# Patient Record
Sex: FEMALE | Race: ASIAN | NOT HISPANIC OR LATINO | Employment: PART TIME | URBAN - METROPOLITAN AREA
[De-identification: names, ages, dates, MRNs, and addresses within clinical notes are randomized per-mention and may not be internally consistent; named-entity substitution may affect disease eponyms.]

---

## 2018-03-22 ENCOUNTER — OFFICE VISIT (OUTPATIENT)
Dept: INTERNAL MEDICINE | Facility: MEDICAL CENTER | Age: 43
End: 2018-03-22
Payer: COMMERCIAL

## 2018-03-22 VITALS
SYSTOLIC BLOOD PRESSURE: 140 MMHG | WEIGHT: 137 LBS | BODY MASS INDEX: 28.76 KG/M2 | DIASTOLIC BLOOD PRESSURE: 92 MMHG | TEMPERATURE: 98.1 F | HEIGHT: 58 IN | OXYGEN SATURATION: 95 % | HEART RATE: 89 BPM

## 2018-03-22 DIAGNOSIS — E66.3 OVERWEIGHT (BMI 25.0-29.9): ICD-10-CM

## 2018-03-22 DIAGNOSIS — I10 HYPERTENSION, UNSPECIFIED TYPE: ICD-10-CM

## 2018-03-22 DIAGNOSIS — N95.1 HOT FLASHES DUE TO MENOPAUSE: ICD-10-CM

## 2018-03-22 DIAGNOSIS — E78.5 HYPERLIPIDEMIA, UNSPECIFIED HYPERLIPIDEMIA TYPE: ICD-10-CM

## 2018-03-22 DIAGNOSIS — Z76.89 ENCOUNTER TO ESTABLISH CARE: ICD-10-CM

## 2018-03-22 DIAGNOSIS — E03.9 HYPOTHYROIDISM, UNSPECIFIED TYPE: ICD-10-CM

## 2018-03-22 DIAGNOSIS — Z00.00 HEALTH CARE MAINTENANCE: ICD-10-CM

## 2018-03-22 PROCEDURE — 99204 OFFICE O/P NEW MOD 45 MIN: CPT | Mod: GC | Performed by: INTERNAL MEDICINE

## 2018-03-22 RX ORDER — ALBUTEROL SULFATE 90 UG/1
2 AEROSOL, METERED RESPIRATORY (INHALATION) EVERY 6 HOURS PRN
COMMUNITY
End: 2018-08-16 | Stop reason: SDUPTHER

## 2018-03-22 RX ORDER — VITAMIN E 268 MG
400 CAPSULE ORAL DAILY
Qty: 30 CAP | Refills: 3 | Status: SHIPPED | OUTPATIENT
Start: 2018-03-22 | End: 2018-10-11

## 2018-03-22 RX ORDER — LEVOTHYROXINE SODIUM 0.07 MG/1
TABLET ORAL
COMMUNITY
Start: 2018-03-12 | End: 2018-06-08

## 2018-03-22 RX ORDER — PRAVASTATIN SODIUM 10 MG
TABLET ORAL
COMMUNITY
Start: 2018-03-12 | End: 2018-04-02

## 2018-03-22 RX ORDER — BENZOCAINE/MENTHOL 6 MG-10 MG
LOZENGE MUCOUS MEMBRANE 2 TIMES DAILY
COMMUNITY
End: 2018-04-16

## 2018-03-22 ASSESSMENT — PATIENT HEALTH QUESTIONNAIRE - PHQ9: CLINICAL INTERPRETATION OF PHQ2 SCORE: 0

## 2018-03-22 NOTE — LETTER
[facility]  Mani Ball M.D.  [address] [place] [address]  Fax: [phone]   Authorization for Release/Disclosure of   Protected Health Information   Name: REGINE CHAMBERLAIN : 1975 SSN: [id number]   Address: [facility] [address]  [place] [address] Phone:    [phone] (home)    I authorize the entity listed below to release/disclose the PHI below to:   [facility]/Mani Ball M.D. and Mani Ball M.D.   Provider or Entity Name:                                        [facility]; Dr. Negin Winter     Address   City, State, Zip                               [address] [place] [address]   Phone: [phone]      Fax: [phone]     Reason for request: continuity of care   Information to be released:    [  ] LAST COLONOSCOPY,  including any PATH REPORT and follow-up  [  ] LAST FIT/COLOGUARD RESULT [  ] LAST DEXA  [  ] LAST MAMMOGRAM  [  ] LAST PAP  [  ] LAST LABS [  ] RETINA EXAM REPORT  [  ] IMMUNIZATION RECORDS  [X] Release all info      [  ] Check here and initial the line next to each item to release ALL health information INCLUDING  _____ Care and treatment for drug and / or alcohol abuse  _____ HIV testing, infection status, or AIDS  _____ Genetic Testing    DATES OF SERVICE OR TIME PERIOD TO BE DISCLOSED: _____________  I understand and acknowledge that:  * This Authorization may be revoked at any time by you in writing, except if your health information has already been used or disclosed.  * Your health information that will be used or disclosed as a result of you signing this authorization could be re-disclosed by the recipient. If this occurs, your re-disclosed health information may no longer be protected by State or Federal laws.  * You may refuse to sign this Authorization. Your refusal will not affect your ability to obtain treatment.  * This Authorization becomes effective upon signing and will  on (date) __________.      If no  date is indicated, this Authorization will  one (1) year from the signature date.    Name: Ameena Hines    Signature:   Date:     3/22/2018       PLEASE FAX REQUESTED RECORDS BACK TO: [phone]

## 2018-03-22 NOTE — PROGRESS NOTES
New Patient to Establish    Reason to establish: recent insurance change    CC: establish care    HPI: 43 yr old female patient with PMHx of HTN, hypothyroidism, HLD came in to establish care and f/u on chronic problems.    Patient's prior PCP- Dr. Kam, [facility]. Given her recent new insurance change , her prior PCP is not accepting her new insurance so patient came to establish care.    HTN: Diagnosed about 4 yrs back and her prior PCP started on cardizem 30 mg PO once daily. Currently denies any related symptoms.     HLD: Diagnosed about 1 yr back and on pravastatin 10 mg PO once daily. Denies any related symptoms.    Hypothyroidism: Diagnosed about 6yrs back. Currently on levothyroxine 75 mcg PO once daily. Denies related symptoms.    Hot flashes: Patient stated her menstrual cycles stopped about 4-5 months back. Since then she has been having hot flashes. But these symptoms does not limit her daily activities.    Otherwise denies any other complaints today.    Patient Active Problem List    Diagnosis Date Noted   • Hypertension 03/22/2018   • Hypothyroidism 03/22/2018   • Hyperlipidemia 03/22/2018   • Hot flashes due to menopause 03/22/2018       Past Medical History:   Diagnosis Date   • Hyperlipidemia    • Hypertension    • Thyroid disease        Current Outpatient Prescriptions   Medication Sig Dispense Refill   • levothyroxine (SYNTHROID) 75 MCG Tab      • pravastatin (PRAVACHOL) 10 MG Tab      • DILTIAZem (CARDIZEM) 30 MG Tab      • hydrocortisone 1 % Cream Apply  to affected area(s) 2 times a day.     • albuterol (PROVENTIL HFA) 108 (90 Base) MCG/ACT Aero Soln inhalation aerosol Inhale 2 Puffs by mouth every 6 hours as needed for Shortness of Breath.     • vitamin e (VITAMIN E) 400 UNIT Cap Take 1 Cap by mouth every day. 30 Cap 3     No current facility-administered medications for this visit.        Allergies as of 03/22/2018   • (No Known Allergies)       Social History  "    Social History   • Marital status:      Spouse name: N/A   • Number of children: N/A   • Years of education: N/A     Occupational History   • Not on file.     Social History Main Topics   • Smoking status: Never Smoker   • Smokeless tobacco: Never Used   • Alcohol use No   • Drug use: No   • Sexual activity: Yes     Partners: Male     Other Topics Concern   • Not on file     Social History Narrative   • No narrative on file       Family History   Problem Relation Age of Onset   • Hypertension Mother    • Hyperlipidemia Mother        Past Surgical History:   Procedure Laterality Date   • LAPAROSCOPY         ROS: As per HPI. Additional pertinent symptoms as noted below.    Constitutional: Denies fever/chills/weight changes. Positive for hot flashes  Eyes: Denies changes/pain in vision  ENT: Denies sore throat/congestion/ear ache.   Cardiovascular: Denies chest pain /palpitations/edema.   Respiratory: Denies SOB/cough/PND/orthopnea  Abdomen: Denies difficulty swallowing/ diarrhea/constipation/abdominal pain/nausea/vomiting  Genitourinary: Normal urinary habits.   Musculo-skeletal: normal ambulation.Denies muscle or joint pains.  Skin: Denies rash/lesions.  Neurological: Denies weakness/tingling/numbness/headache  Psychological: good mood and cooperative. Denies anxiety /depression       /92   Pulse 89   Temp 36.7 °C (98.1 °F)   Ht 1.473 m (4' 10\")   Wt 62.1 kg (137 lb)   SpO2 95%   BMI 28.63 kg/m²     Physical Exam  General:  Alert and oriented, No apparent distress.    Eyes: Pupils equal and reactive. No scleral icterus.    Throat: Clear no erythema or exudates noted.    Neck: Supple. No lymphadenopathy noted. Thyroid not enlarged.    Lungs: Clear to auscultation and percussion bilaterally.    Cardiovascular: Regular rate and rhythm. No murmurs, rubs or gallops.    Abdomen:  Benign. No rebound or guarding noted.    Extremities: No clubbing, cyanosis, edema.    Skin: Clear. No rash or suspicious " skin lesions noted.  Neurological: Oriented to time, place, and person .Cranial nerves intact. No motor/sensory deficits.Reflexes were normal and symmetrical in both upper and lower extremities     Musculoskeletal : NROM of all extremities. No tenderness or deformity noted.     Note: I have reviewed all pertinent labs and diagnostic tests associated with this visit with specific comments listed under the assessment and plan below      Assessment and Plan    1. Hypertension, unspecified type  - diagnosed about 4 yrs back  - currently on Cardizem 30 mg PO once daily  - denies any related symptoms  - today BP in the office 140/92  -  states her recent blood work was about 3months back  - requested records from her prior care  - will continue to follow up in 2 weeks.    2. Hypothyroidism, unspecified type  - currently on levothyroxine 75 mcg PO once daily   - diagnosed 6 yrs years back  - denies any related symptoms  -  states her recent blood work was about 3months back  - requested records from her prior care  - will continue to follow up in 2 weeks.    3. Hyperlipidemia, unspecified hyperlipidemia type  - diagnosed about 1 yr back  - denies any related symptoms  - currently on pravastatin 10 mg PO once daily  -  states her recent blood work was about 3months back  - requested records from her prior care  - will continue to follow up in 2 weeks.    4. Encounter to establish care  - patient came to establish care with us today because she changed her insurance and her prior PCP Dr. Kam, [facility]- is no longer accepting her new insurance.    5. Hot flashes due to menopause  - patient states she is experiencing hot flashes ever since her last menstrual period 4 months back  - states her hot flashes does not limit her daily activities- indicating mild degree of classification for which behavioral therapy indicated but no medications yet  - educated and counseled the patient - relaxation  techniques, advised to identify triggers and avoid; avoid excess caffeine, stressful conditions, spicy foods; recommended weight loss.  - ordered vitamin E supplementation as it studied to shown to reduce the frequency/ severity in some studies.    6. Overweight (BMI 25.0-29.9)  - advised to eat healthy- DASH diet  - Advised to exercise regularly- atleast 30 mins a day for 5 days a week.    7. Health care maintenance  - did not take influenza vaccine this season  - does not remember when she had her last tetanus vaccination- will order in the next visit  - pap smear about 3 months back -states results were negative  - mammogram 1 yr back - normal as per patient  - not a candidate for colonoscopy or DEXA scan yet  - denies smoking,drinking alcohol or illicit drug use  - given her high BMI and comorbidities- advised to eat healthy- DASH diet and exercise regularly atleast 30 mins a day for 5 days a week.      Risk Assessment (discuss potential complications a function of chronic problems): spent 35 min's discussing plans of management and educating patient regarding her chronic conditions and related complications    Complexity (discuss number of co-morbidities): discussed HTN,HLD, Hypothyroidism, Menopause, Hot flashes, Overweight    Signed by: Mani Ball M.D.

## 2018-03-22 NOTE — PATIENT INSTRUCTIONS
"Food Choices to Lower Your Triglycerides  Triglycerides are a type of fat in your blood. High levels of triglycerides can increase the risk of heart disease and stroke. If your triglyceride levels are high, the foods you eat and your eating habits are very important. Choosing the right foods can help lower your triglycerides.   WHAT GENERAL GUIDELINES DO I NEED TO FOLLOW?  · Lose weight if you are overweight.    · Limit or avoid alcohol.    · Fill one half of your plate with vegetables and green salads.    · Limit fruit to two servings a day. Choose fruit instead of juice.    · Make one fourth of your plate whole grains. Look for the word \"whole\" as the first word in the ingredient list.  · Fill one fourth of your plate with lean protein foods.  · Enjoy fatty fish (such as salmon, mackerel, sardines, and tuna) three times a week.    · Choose healthy fats.    · Limit foods high in starch and sugar.  · Eat more home-cooked food and less restaurant, buffet, and fast food.  · Limit fried foods.  · Cook foods using methods other than frying.  · Limit saturated fats.  · Check ingredient lists to avoid foods with partially hydrogenated oils (trans fats) in them.  WHAT FOODS CAN I EAT?   Grains  Whole grains, such as whole wheat or whole grain breads, crackers, cereals, and pasta. Unsweetened oatmeal, bulgur, barley, quinoa, or brown rice. Corn or whole wheat flour tortillas.   Vegetables  Fresh or frozen vegetables (raw, steamed, roasted, or grilled). Green salads.  Fruits  All fresh, canned (in natural juice), or frozen fruits.  Meat and Other Protein Products  Ground beef (85% or leaner), grass-fed beef, or beef trimmed of fat. Skinless chicken or turkey. Ground chicken or turkey. Pork trimmed of fat. All fish and seafood. Eggs. Dried beans, peas, or lentils. Unsalted nuts or seeds. Unsalted canned or dry beans.  Dairy  Low-fat dairy products, such as skim or 1% milk, 2% or reduced-fat cheeses, low-fat ricotta or cottage " cheese, or plain low-fat yogurt.  Fats and Oils  Tub margarines without trans fats. Light or reduced-fat mayonnaise and salad dressings. Avocado. Safflower, olive, or canola oils. Natural peanut or almond butter.  The items listed above may not be a complete list of recommended foods or beverages. Contact your dietitian for more options.  WHAT FOODS ARE NOT RECOMMENDED?   Grains  White bread. White pasta. White rice. Cornbread. Bagels, pastries, and croissants. Crackers that contain trans fat.  Vegetables  White potatoes. Corn. Creamed or fried vegetables. Vegetables in a cheese sauce.  Fruits  Dried fruits. Canned fruit in light or heavy syrup. Fruit juice.  Meat and Other Protein Products  Fatty cuts of meat. Ribs, chicken wings, kern, sausage, bologna, salami, chitterlings, fatback, hot dogs, bratwurst, and packaged luncheon meats.  Dairy  Whole or 2% milk, cream, half-and-half, and cream cheese. Whole-fat or sweetened yogurt. Full-fat cheeses. Nondairy creamers and whipped toppings. Processed cheese, cheese spreads, or cheese curds.  Sweets and Desserts  Corn syrup, sugars, honey, and molasses. Candy. Jam and jelly. Syrup. Sweetened cereals. Cookies, pies, cakes, donuts, muffins, and ice cream.  Fats and Oils  Butter, stick margarine, lard, shortening, ghee, or kern fat. Coconut, palm kernel, or palm oils.  Beverages  Alcohol. Sweetened drinks (such as sodas, lemonade, and fruit drinks or punches).  The items listed above may not be a complete list of foods and beverages to avoid. Contact your dietitian for more information.     This information is not intended to replace advice given to you by your health care provider. Make sure you discuss any questions you have with your health care provider.     Document Released: 10/05/2005 Document Revised: 01/08/2016 Document Reviewed: 10/22/2014  [employer] Interactive Patient Education ©2016 [employer]  Hypertension  Hypertension, commonly called high blood  pressure, is when the force of blood pumping through your arteries is too strong. Your arteries are the blood vessels that carry blood from your heart throughout your body. A blood pressure reading consists of a higher number over a lower number, such as 110/72. The higher number (systolic) is the pressure inside your arteries when your heart pumps. The lower number (diastolic) is the pressure inside your arteries when your heart relaxes. Ideally you want your blood pressure below 120/80.  Hypertension forces your heart to work harder to pump blood. Your arteries may become narrow or stiff. Having untreated or uncontrolled hypertension can cause heart attack, stroke, kidney disease, and other problems.  What increases the risk?  Some risk factors for high blood pressure are controllable. Others are not.  Risk factors you cannot control include:  · Race. You may be at higher risk if you are .  · Age. Risk increases with age.  · Gender. Men are at higher risk than women before age 45 years. After age 65, women are at higher risk than men.  Risk factors you can control include:  · Not getting enough exercise or physical activity.  · Being overweight.  · Getting too much fat, sugar, calories, or salt in your diet.  · Drinking too much alcohol.  What are the signs or symptoms?  Hypertension does not usually cause signs or symptoms. Extremely high blood pressure (hypertensive crisis) may cause headache, anxiety, shortness of breath, and nosebleed.  How is this diagnosed?  To check if you have hypertension, your health care provider will measure your blood pressure while you are seated, with your arm held at the level of your heart. It should be measured at least twice using the same arm. Certain conditions can cause a difference in blood pressure between your right and left arms. A blood pressure reading that is higher than normal on one occasion does not mean that you need treatment. If it is not clear  whether you have high blood pressure, you may be asked to return on a different day to have your blood pressure checked again. Or, you may be asked to monitor your blood pressure at home for 1 or more weeks.  How is this treated?  Treating high blood pressure includes making lifestyle changes and possibly taking medicine. Living a healthy lifestyle can help lower high blood pressure. You may need to change some of your habits.  Lifestyle changes may include:  · Following the DASH diet. This diet is high in fruits, vegetables, and whole grains. It is low in salt, red meat, and added sugars.  · Keep your sodium intake below 2,300 mg per day.  · Getting at least 30-45 minutes of aerobic exercise at least 4 times per week.  · Losing weight if necessary.  · Not smoking.  · Limiting alcoholic beverages.  · Learning ways to reduce stress.  Your health care provider may prescribe medicine if lifestyle changes are not enough to get your blood pressure under control, and if one of the following is true:  · You are 18-59 years of age and your systolic blood pressure is above 140.  · You are 60 years of age or older, and your systolic blood pressure is above 150.  · Your diastolic blood pressure is above 90.  · You have diabetes, and your systolic blood pressure is over 140 or your diastolic blood pressure is over 90.  · You have kidney disease and your blood pressure is above 140/90.  · You have heart disease and your blood pressure is above 140/90.  Your personal target blood pressure may vary depending on your medical conditions, your age, and other factors.  Follow these instructions at home:  · Have your blood pressure rechecked as directed by your health care provider.  · Take medicines only as directed by your health care provider. Follow the directions carefully. Blood pressure medicines must be taken as prescribed. The medicine does not work as well when you skip doses. Skipping doses also puts you at risk for  "problems.  · Do not smoke.  · Monitor your blood pressure at home as directed by your health care provider.  Contact a health care provider if:  · You think you are having a reaction to medicines taken.  · You have recurrent headaches or feel dizzy.  · You have swelling in your ankles.  · You have trouble with your vision.  Get help right away if:  · You develop a severe headache or confusion.  · You have unusual weakness, numbness, or feel faint.  · You have severe chest or abdominal pain.  · You vomit repeatedly.  · You have trouble breathing.  This information is not intended to replace advice given to you by your health care provider. Make sure you discuss any questions you have with your health care provider.  Document Released: 12/18/2006 Document Revised: 05/25/2017 Document Reviewed: 10/10/2014  "[employer]" Interactive Patient Education © 2017 "[employer] Inc.  Managing Your Hypertension  Hypertension is commonly called high blood pressure. Blood pressure is a measurement of how strongly your blood is pressing against the walls of your arteries. Arteries are blood vessels that carry blood from your heart throughout your body. Blood pressure does not stay the same. It rises when you are active, excited, or nervous. It lowers when you are sleeping or relaxed.  If the numbers that measure your blood pressure stay above normal most of the time, you are at risk for health problems. Hypertension is a long-term (chronic) condition in which blood pressure is elevated. This condition often has no signs or symptoms. The cause of the condition is usually not known.  What are blood pressure readings?  A blood pressure reading is recorded as two numbers, such as \"120 over 80\" (or 120/80). The first (\"top\") number is called the systolic pressure. It is a measure of the pressure in your arteries as the heart beats. The second (\"bottom\") number is called the diastolic pressure. It is a measure of the pressure in your arteries as " the heart relaxes between beats.  What does my blood pressure reading mean?  Blood pressure is classified into four stages. Based on your blood pressure reading, your health care provider may use the following stages to determine what type of treatment, if any, is needed. Systolic pressure and diastolic pressure are measured in a unit called mm Hg.  Normal  · Systolic pressure: below 120.  · Diastolic pressure: below 80.  Prehypertension  · Systolic pressure: 120-139.  · Diastolic pressure: 80-89.  Hypertension stage 1  · Systolic pressure: 140-159.  · Diastolic pressure: 90-99.  Hypertension stage 2  · Systolic pressure: 160 or above.  · Diastolic pressure: 100 or above.  What health risks are associated with hypertension?  Managing your hypertension is an important responsibility. Uncontrolled hypertension can lead to:  · A heart attack.  · A stroke.  · A weakened blood vessel (aneurysm).  · Heart failure.  · Kidney damage.  · Eye damage.  · Metabolic syndrome.  · Memory and concentration problems.  What changes can I make to manage my hypertension?  Hypertension can be managed effectively by making lifestyle changes and possibly by taking medicines. Your health care provider will help you come up with a plan to bring your blood pressure within a normal range. Your plan should include the following:  Monitoring  · Monitor your blood pressure at home as told by your health care provider. Your personal target blood pressure may vary depending on your medical conditions, your age, and other factors.  · Have your blood pressure rechecked as told by your health care provider.  Lifestyle  · Lose weight if necessary.  · Get at least 30-45 minutes of aerobic exercise at least 4 times a week.  · Do not use any products that contain nicotine or tobacco, such as cigarettes and e-cigarettes. If you need help quitting, ask your health care provider.  · Learn ways to reduce stress.  · Control any chronic conditions, such as  high cholesterol or diabetes.  Eating and drinking  · Follow the DASH diet. This diet is high in fruits, vegetables, and whole grains. It is low in salt, red meat, and added sugars.  · Keep your sodium intake below 2,300 mg per day.  · Limit alcoholic beverages.  Communication  · Review all the medicines you take with your health care provider because there may be side effects or interactions.  · Talk with your health care provider about your diet, exercise habits, and other lifestyle factors that may be contributing to hypertension.  · See your health care provider regularly. Your health care provider can help you create and adjust your plan for managing hypertension.  Will I need medicine to control my blood pressure?  Your health care provider may prescribe medicine if lifestyle changes are not enough to get your blood pressure under control, and if one of the following is true:  · You are 18-59 years of age, and your systolic blood pressure is 140 or higher.  · You are 60 years of age or older, and your systolic blood pressure is 150 or higher.  · Your diastolic blood pressure is 90 or higher.  · You have diabetes, and your systolic blood pressure is over 140 or your diastolic blood pressure is over 90.  · You have kidney disease, and your blood pressure is above 140/90.  · You have heart disease or a history of stroke, and your blood pressure is 140/90 or higher.  Take medicines only as told by your health care provider. Follow the directions carefully. Blood pressure medicines must be taken as prescribed. The medicine does not work as well when you skip doses. Skipping doses also puts you at risk for problems.  Contact a health care provider if:  · You think you are having a reaction to medicines you have taken.  · You have repeated (recurrent) headaches.  · You feel dizzy.  · You have swelling in your ankles.  · You have trouble with your vision.  Get help right away if:  · You develop a severe headache or  confusion.  · You have unusual weakness or numbness, or you feel faint.  · You have severe pain in your chest or abdomen.  · You vomit repeatedly.  · You have trouble breathing.  This information is not intended to replace advice given to you by your health care provider. Make sure you discuss any questions you have with your health care provider.  Document Released: 09/11/2013 Document Revised: 08/22/2017 Document Reviewed: 03/17/2017  [employer] Interactive Patient Education © 2017 [employer]

## 2018-04-02 ENCOUNTER — OFFICE VISIT (OUTPATIENT)
Dept: INTERNAL MEDICINE | Facility: MEDICAL CENTER | Age: 43
End: 2018-04-02
Payer: COMMERCIAL

## 2018-04-02 VITALS
WEIGHT: 136 LBS | DIASTOLIC BLOOD PRESSURE: 78 MMHG | HEIGHT: 58 IN | TEMPERATURE: 97.2 F | BODY MASS INDEX: 28.55 KG/M2 | SYSTOLIC BLOOD PRESSURE: 124 MMHG | HEART RATE: 81 BPM | OXYGEN SATURATION: 96 %

## 2018-04-02 DIAGNOSIS — Z00.00 HEALTH CARE MAINTENANCE: ICD-10-CM

## 2018-04-02 DIAGNOSIS — I10 ESSENTIAL HYPERTENSION: ICD-10-CM

## 2018-04-02 DIAGNOSIS — E03.9 HYPOTHYROIDISM, UNSPECIFIED TYPE: ICD-10-CM

## 2018-04-02 DIAGNOSIS — E66.3 OVERWEIGHT (BMI 25.0-29.9): ICD-10-CM

## 2018-04-02 DIAGNOSIS — E78.5 HYPERLIPIDEMIA, UNSPECIFIED HYPERLIPIDEMIA TYPE: ICD-10-CM

## 2018-04-02 DIAGNOSIS — Z23 ENCOUNTER FOR VACCINATION: ICD-10-CM

## 2018-04-02 PROCEDURE — 90471 IMMUNIZATION ADMIN: CPT | Performed by: INTERNAL MEDICINE

## 2018-04-02 PROCEDURE — 90715 TDAP VACCINE 7 YRS/> IM: CPT | Performed by: INTERNAL MEDICINE

## 2018-04-02 PROCEDURE — 99214 OFFICE O/P EST MOD 30 MIN: CPT | Mod: GC,25 | Performed by: INTERNAL MEDICINE

## 2018-04-02 RX ORDER — LISINOPRIL 10 MG/1
10 TABLET ORAL DAILY
Qty: 30 TAB | Refills: 0 | Status: SHIPPED | OUTPATIENT
Start: 2018-04-02 | End: 2018-04-16 | Stop reason: SDUPTHER

## 2018-04-02 NOTE — PATIENT INSTRUCTIONS
Constipation, Adult  Constipation is when a person has fewer bowel movements in a week than normal, has difficulty having a bowel movement, or has stools that are dry, hard, or larger than normal. Constipation may be caused by an underlying condition. It may become worse with age if a person takes certain medicines and does not take in enough fluids.  Follow these instructions at home:  Eating and drinking  · Eat foods that have a lot of fiber, such as fresh fruits and vegetables, whole grains, and beans.  · Limit foods that are high in fat, low in fiber, or overly processed, such as french fries, hamburgers, cookies, candies, and soda.  · Drink enough fluid to keep your urine clear or pale yellow.  General instructions  · Exercise regularly or as told by your health care provider.  · Go to the restroom when you have the urge to go. Do not hold it in.  · Take over-the-counter and prescription medicines only as told by your health care provider. These include any fiber supplements.  · Practice pelvic floor retraining exercises, such as deep breathing while relaxing the lower abdomen and pelvic floor relaxation during bowel movements.  · Watch your condition for any changes.  · Keep all follow-up visits as told by your health care provider. This is important.  Contact a health care provider if:  · You have pain that gets worse.  · You have a fever.  · You do not have a bowel movement after 4 days.  · You vomit.  · You are not hungry.  · You lose weight.  · You are bleeding from the anus.  · You have thin, pencil-like stools.  Get help right away if:  · You have a fever and your symptoms suddenly get worse.  · You leak stool or have blood in your stool.  · Your abdomen is bloated.  · You have severe pain in your abdomen.  · You feel dizzy or you faint.  This information is not intended to replace advice given to you by your health care provider. Make sure you discuss any questions you have with your health care  provider.  Document Released: 09/15/2005 Document Revised: 07/07/2017 Document Reviewed: 06/07/2017  [employer] Interactive Patient Education © 2017 [employer]  Menopause  Menopause is the normal time of life when menstrual periods stop completely. Menopause is complete when you have missed 12 consecutive menstrual periods. It usually occurs between the ages of 48 years and 55 years. Very rarely does a woman develop menopause before the age of 40 years. At menopause, your ovaries stop producing the female hormones estrogen and progesterone. This can cause undesirable symptoms and also affect your health. Sometimes the symptoms may occur 4-5 years before the menopause begins. There is no relationship between menopause and:  · Oral contraceptives.  · Number of children you had.  · Race.  · The age your menstrual periods started (menarche).  Heavy smokers and very thin women may develop menopause earlier in life.  What are the causes?  · The ovaries stop producing the female hormones estrogen and progesterone.  Other causes include:  · Surgery to remove both ovaries.  · The ovaries stop functioning for no known reason.  · Tumors of the pituitary gland in the brain.  · Medical disease that affects the ovaries and hormone production.  · Radiation treatment to the abdomen or pelvis.  · Chemotherapy that affects the ovaries.  What are the signs or symptoms?  · Hot flashes.  · Night sweats.  · Decrease in sex drive.  · Vaginal dryness and thinning of the vagina causing painful intercourse.  · Dryness of the skin and developing wrinkles.  · Headaches.  · Tiredness.  · Irritability.  · Memory problems.  · Weight gain.  · Bladder infections.  · Hair growth of the face and chest.  · Infertility.  More serious symptoms include:  · Loss of bone (osteoporosis) causing breaks (fractures).  · Depression.  · Hardening and narrowing of the arteries (atherosclerosis) causing heart attacks and strokes.  How is this diagnosed?  · When  the menstrual periods have stopped for 12 straight months.  · Physical exam.  · Hormone studies of the blood.  How is this treated?  There are many treatment choices and nearly as many questions about them. The decisions to treat or not to treat menopausal changes is an individual choice made with your health care provider. Your health care provider can discuss the treatments with you. Together, you can decide which treatment will work best for you. Your treatment choices may include:  · Hormone therapy (estrogen and progesterone).  · Non-hormonal medicines.  · Treating the individual symptoms with medicine (for example antidepressants for depression).  · Herbal medicines that may help specific symptoms.  · Counseling by a psychiatrist or psychologist.  · Group therapy.  · Lifestyle changes including:  ¨ Eating healthy.  ¨ Regular exercise.  ¨ Limiting caffeine and alcohol.  ¨ Stress management and meditation.  · No treatment.  Follow these instructions at home:  · Take the medicine your health care provider gives you as directed.  · Get plenty of sleep and rest.  · Exercise regularly.  · Eat a diet that contains calcium (good for the bones) and soy products (acts like estrogen hormone).  · Avoid alcoholic beverages.  · Do not smoke.  · If you have hot flashes, dress in layers.  · Take supplements, calcium, and vitamin D to strengthen bones.  · You can use over-the-counter lubricants or moisturizers for vaginal dryness.  · Group therapy is sometimes very helpful.  · Acupuncture may be helpful in some cases.  Contact a health care provider if:  · You are not sure you are in menopause.  · You are having menopausal symptoms and need advice and treatment.  · You are still having menstrual periods after age 55 years.  · You have pain with intercourse.  · Menopause is complete (no menstrual period for 12 months) and you develop vaginal bleeding.  · You need a referral to a specialist (gynecologist, psychiatrist, or  psychologist) for treatment.  Get help right away if:  · You have severe depression.  · You have excessive vaginal bleeding.  · You fell and think you have a broken bone.  · You have pain when you urinate.  · You develop leg or chest pain.  · You have a fast pounding heart beat (palpitations).  · You have severe headaches.  · You develop vision problems.  · You feel a lump in your breast.  · You have abdominal pain or severe indigestion.  This information is not intended to replace advice given to you by your health care provider. Make sure you discuss any questions you have with your health care provider.  Document Released: 03/09/2005 Document Revised: 05/25/2017 Document Reviewed: 07/17/2014  [employer] Interactive Patient Education © 2017 [employer]  Hypertension  Hypertension, commonly called high blood pressure, is when the force of blood pumping through your arteries is too strong. Your arteries are the blood vessels that carry blood from your heart throughout your body. A blood pressure reading consists of a higher number over a lower number, such as 110/72. The higher number (systolic) is the pressure inside your arteries when your heart pumps. The lower number (diastolic) is the pressure inside your arteries when your heart relaxes. Ideally you want your blood pressure below 120/80.  Hypertension forces your heart to work harder to pump blood. Your arteries may become narrow or stiff. Having untreated or uncontrolled hypertension can cause heart attack, stroke, kidney disease, and other problems.  What increases the risk?  Some risk factors for high blood pressure are controllable. Others are not.  Risk factors you cannot control include:  · Race. You may be at higher risk if you are .  · Age. Risk increases with age.  · Gender. Men are at higher risk than women before age 45 years. After age 65, women are at higher risk than men.  Risk factors you can control include:  · Not getting  enough exercise or physical activity.  · Being overweight.  · Getting too much fat, sugar, calories, or salt in your diet.  · Drinking too much alcohol.  What are the signs or symptoms?  Hypertension does not usually cause signs or symptoms. Extremely high blood pressure (hypertensive crisis) may cause headache, anxiety, shortness of breath, and nosebleed.  How is this diagnosed?  To check if you have hypertension, your health care provider will measure your blood pressure while you are seated, with your arm held at the level of your heart. It should be measured at least twice using the same arm. Certain conditions can cause a difference in blood pressure between your right and left arms. A blood pressure reading that is higher than normal on one occasion does not mean that you need treatment. If it is not clear whether you have high blood pressure, you may be asked to return on a different day to have your blood pressure checked again. Or, you may be asked to monitor your blood pressure at home for 1 or more weeks.  How is this treated?  Treating high blood pressure includes making lifestyle changes and possibly taking medicine. Living a healthy lifestyle can help lower high blood pressure. You may need to change some of your habits.  Lifestyle changes may include:  · Following the DASH diet. This diet is high in fruits, vegetables, and whole grains. It is low in salt, red meat, and added sugars.  · Keep your sodium intake below 2,300 mg per day.  · Getting at least 30-45 minutes of aerobic exercise at least 4 times per week.  · Losing weight if necessary.  · Not smoking.  · Limiting alcoholic beverages.  · Learning ways to reduce stress.  Your health care provider may prescribe medicine if lifestyle changes are not enough to get your blood pressure under control, and if one of the following is true:  · You are 18-59 years of age and your systolic blood pressure is above 140.  · You are 60 years of age or older,  and your systolic blood pressure is above 150.  · Your diastolic blood pressure is above 90.  · You have diabetes, and your systolic blood pressure is over 140 or your diastolic blood pressure is over 90.  · You have kidney disease and your blood pressure is above 140/90.  · You have heart disease and your blood pressure is above 140/90.  Your personal target blood pressure may vary depending on your medical conditions, your age, and other factors.  Follow these instructions at home:  · Have your blood pressure rechecked as directed by your health care provider.  · Take medicines only as directed by your health care provider. Follow the directions carefully. Blood pressure medicines must be taken as prescribed. The medicine does not work as well when you skip doses. Skipping doses also puts you at risk for problems.  · Do not smoke.  · Monitor your blood pressure at home as directed by your health care provider.  Contact a health care provider if:  · You think you are having a reaction to medicines taken.  · You have recurrent headaches or feel dizzy.  · You have swelling in your ankles.  · You have trouble with your vision.  Get help right away if:  · You develop a severe headache or confusion.  · You have unusual weakness, numbness, or feel faint.  · You have severe chest or abdominal pain.  · You vomit repeatedly.  · You have trouble breathing.  This information is not intended to replace advice given to you by your health care provider. Make sure you discuss any questions you have with your health care provider.  Document Released: 12/18/2006 Document Revised: 05/25/2017 Document Reviewed: 10/10/2014  [employer] Interactive Patient Education © 2017 [employer]

## 2018-04-02 NOTE — PROGRESS NOTES
Established Patient    Ameena presents today with the following:    CC: f/u BP    HPI: 43 yr old female patient with PMHx of HTN, hypothyroidism, HLD who established care last visit comes f/u on chronic problems.     Patient's prior PCP- Dr. Kam, [facility].      HTN: Diagnosed about 4 yrs back and her prior PCP started on cardizem 30 mg PO once daily. Currently denies any related symptoms. Brings home readings 140's/90's.     HLD: Diagnosed about 1 yr back and on pravastatin 10 mg PO once daily. Denies any related symptoms. Last lipid panel in November 2017 which showed . Patient was placed on pravastatin 1 yr back initially and was off of medications and based on repeat panel in November again restarted on pravastatin.     Hypothyroidism: Diagnosed about 6yrs back. Currently on levothyroxine 75 mcg PO once daily. Denies related symptoms. Latest TSH in November 0.743.     Hot flashes: Patient stated her menstrual cycles stopped about 4-5 months back. Since then she has been having hot flashes. But these symptoms does not limit her daily activities.      Otherwise denies any other complaints today    Patient Active Problem List    Diagnosis Date Noted   • Overweight (BMI 25.0-29.9) 04/02/2018   • Hypertension 03/22/2018   • Hypothyroidism 03/22/2018   • Hyperlipidemia 03/22/2018   • Hot flashes due to menopause 03/22/2018       Current Outpatient Prescriptions   Medication Sig Dispense Refill   • lisinopril (PRINIVIL) 10 MG Tab Take 1 Tab by mouth every day. 30 Tab 0   • levothyroxine (SYNTHROID) 75 MCG Tab      • hydrocortisone 1 % Cream Apply  to affected area(s) 2 times a day.     • albuterol (PROVENTIL HFA) 108 (90 Base) MCG/ACT Aero Soln inhalation aerosol Inhale 2 Puffs by mouth every 6 hours as needed for Shortness of Breath.     • vitamin e (VITAMIN E) 400 UNIT Cap Take 1 Cap by mouth every day. 30 Cap 3     No current facility-administered medications for this visit.   "      Social History     Social History   • Marital status:      Spouse name: N/A   • Number of children: N/A   • Years of education: N/A     Occupational History   • Not on file.     Social History Main Topics   • Smoking status: Never Smoker   • Smokeless tobacco: Never Used   • Alcohol use No   • Drug use: No   • Sexual activity: Yes     Partners: Male     Other Topics Concern   • Not on file     Social History Narrative   • No narrative on file       Family History   Problem Relation Age of Onset   • Hypertension Mother    • Hyperlipidemia Mother        ROS: As per HPI. Additional pertinent symptoms as noted below.    Constitutional: Denies fever/chills/weight changes. Positive for hot flashes  Eyes: Denies changes/pain in vision  ENT: Denies sore throat/congestion/ear ache.   Cardiovascular: Denies chest pain /palpitations/edema.   Respiratory: Denies SOB/cough/PND/orthopnea  Abdomen: Denies difficulty swallowing/ diarrhea/constipation/abdominal pain/nausea/vomiting  Genitourinary: Normal urinary habits.   Musculo-skeletal: normal ambulation.Denies muscle or joint pains.  Skin: Denies rash/lesions.  Neurological: Denies weakness/tingling/numbness/headache  Psychological: good mood and cooperative. Denies anxiety /depression       /78   Pulse 81   Temp 36.2 °C (97.2 °F)   Ht 1.473 m (4' 10\")   Wt 61.7 kg (136 lb)   SpO2 96%   BMI 28.42 kg/m²     Physical Exam  General:  Alert and oriented, No apparent distress.    Eyes: Pupils equal and reactive. No scleral icterus.    Throat: Clear no erythema or exudates noted.    Neck: Supple. No lymphadenopathy noted. Thyroid not enlarged.    Lungs: Clear to auscultation and percussion bilaterally.    Cardiovascular: Regular rate and rhythm. No murmurs, rubs or gallops.    Abdomen:  Benign. No rebound or guarding noted.    Extremities: No clubbing, cyanosis, edema.    Skin: Clear. No rash or suspicious skin lesions noted.    Neurological: Oriented to time, " place, and person .Cranial nerves intact. No motor/sensory deficits.Reflexes were normal and symmetrical in both upper and lower extremities     Musculoskeletal : NROM of all extremities. No tenderness or deformity noted.     Note: I have reviewed all pertinent labs and diagnostic tests associated with this visit with specific comments listed under the assessment and plan below      Assessment and Plan    1. Essential hypertension  - diagnosed about 4 yrs back  - currently on Cardizem 30 mg PO once daily  - denies any related symptoms  - today BP in the office 124/78 but home readings ranged in 140's/90's  -  states her recent blood work was about 3months back- CMP normal  - discontinued cardizem and started on lisinopril 10 mg PO QD  - requested records from her prior care but haven't received any yet  - will follow up with micro albumin creatinine ratio in the next visit in 10days and advised to bring in BP monitoring device with her.    2. Hypothyroidism, unspecified type  - currently on levothyroxine 75 mcg PO once daily   - diagnosed 6 yrs years back  - denies any related symptoms  -  states her recent blood work was in normal 2017 which showed normal level.    3. Hyperlipidemia, unspecified hyperlipidemia type  - diagnosed about 1 yr back  - denies any related symptoms  - currently on pravastatin 10 mg PO once daily  - latest lipid panel in November- showed total cholest-214, trigly- 204, HDL 57, VLDL 41 and  and she was not on statin. ASCVD risk 1.4%  - will repeat lipid panel and advised to discontinue pravastatin  - will continue to follow up in 10 days    4. Health care maintenance  - did not take influenza vaccine this season  - does not remember when she had her last tetanus vaccination- ordered today and administered  - pap smear about 3 months back -states results were negative-awaiting records from prior PCP office.  - mammogram 1 yr back - normal as per patient  - not a candidate for  colonoscopy or DEXA scan yet  - denies smoking,drinking alcohol or illicit drug use  - given her high BMI and comorbidities- advised to eat healthy- DASH diet and exercise regularly atleast 30 mins a day for 5 days a week.    5. Overweight (BMI 25.0-29.9)  - advised to eat healthy- DASH diet  - Advised to exercise regularly- atleast 30 mins a day for 5 days a week.    6. Encounter for vaccination  - ordered Tdap and administered.      Signed by: Mani Ball M.D.

## 2018-04-02 NOTE — NON-PROVIDER
"Ameena Hines is a 43 y.o. female here for a non-provider visit for:   TDAP    Reason for immunization: Overdue/Provider Recommended  Immunization records indicate need for vaccine: Yes, confirmed with Epic  Minimum interval has been met for this vaccine: Yes  ABN completed: Not Indicated    Order and dose verified by: Kim BARCLAY Dated  2/24/2015 was given to patient: Yes  All IAC Questionnaire questions were answered \"No.\"    Patient tolerated injection and no adverse effects were observed or reported: Yes    Pt scheduled for next dose in series: Not Indicated    "

## 2018-04-05 ENCOUNTER — HOSPITAL ENCOUNTER (OUTPATIENT)
Dept: LAB | Facility: MEDICAL CENTER | Age: 43
End: 2018-04-05
Attending: INTERNAL MEDICINE
Payer: COMMERCIAL

## 2018-04-05 DIAGNOSIS — I10 ESSENTIAL HYPERTENSION: ICD-10-CM

## 2018-04-05 DIAGNOSIS — E78.5 HYPERLIPIDEMIA, UNSPECIFIED HYPERLIPIDEMIA TYPE: ICD-10-CM

## 2018-04-05 LAB
CHOLEST SERPL-MCNC: 188 MG/DL (ref 100–199)
CREAT UR-MCNC: 73.6 MG/DL
HDLC SERPL-MCNC: 56 MG/DL
LDLC SERPL CALC-MCNC: 102 MG/DL
MICROALBUMIN UR-MCNC: <0.7 MG/DL
MICROALBUMIN/CREAT UR: NORMAL MG/G (ref 0–30)
TRIGL SERPL-MCNC: 151 MG/DL (ref 0–149)

## 2018-04-05 PROCEDURE — 82570 ASSAY OF URINE CREATININE: CPT

## 2018-04-05 PROCEDURE — 82043 UR ALBUMIN QUANTITATIVE: CPT

## 2018-04-05 PROCEDURE — 80061 LIPID PANEL: CPT

## 2018-04-05 PROCEDURE — 36415 COLL VENOUS BLD VENIPUNCTURE: CPT

## 2018-04-16 ENCOUNTER — OFFICE VISIT (OUTPATIENT)
Dept: INTERNAL MEDICINE | Facility: MEDICAL CENTER | Age: 43
End: 2018-04-16
Payer: COMMERCIAL

## 2018-04-16 VITALS
DIASTOLIC BLOOD PRESSURE: 92 MMHG | HEART RATE: 77 BPM | HEIGHT: 58 IN | WEIGHT: 135 LBS | TEMPERATURE: 97.6 F | BODY MASS INDEX: 28.34 KG/M2 | OXYGEN SATURATION: 94 % | SYSTOLIC BLOOD PRESSURE: 144 MMHG

## 2018-04-16 DIAGNOSIS — I10 HYPERTENSION, UNSPECIFIED TYPE: Primary | ICD-10-CM

## 2018-04-16 DIAGNOSIS — E03.9 HYPOTHYROIDISM, UNSPECIFIED TYPE: ICD-10-CM

## 2018-04-16 DIAGNOSIS — J30.2 SEASONAL ALLERGIC RHINITIS, UNSPECIFIED CHRONICITY, UNSPECIFIED TRIGGER: ICD-10-CM

## 2018-04-16 PROBLEM — J30.9 ALLERGIC RHINITIS: Status: ACTIVE | Noted: 2018-04-16

## 2018-04-16 PROCEDURE — 99214 OFFICE O/P EST MOD 30 MIN: CPT | Mod: GC | Performed by: INTERNAL MEDICINE

## 2018-04-16 RX ORDER — LISINOPRIL 20 MG/1
20 TABLET ORAL DAILY
Qty: 60 TAB | Refills: 2 | Status: SHIPPED | OUTPATIENT
Start: 2018-04-16 | End: 2018-05-23 | Stop reason: SINTOL

## 2018-04-16 RX ORDER — LORATADINE 10 MG/1
10 TABLET ORAL DAILY
Qty: 60 TAB | Refills: 2 | Status: SHIPPED | OUTPATIENT
Start: 2018-04-16 | End: 2019-01-04

## 2018-04-16 ASSESSMENT — ENCOUNTER SYMPTOMS
PALPITATIONS: 0
MYALGIAS: 0
DOUBLE VISION: 0
FEVER: 0
DEPRESSION: 0
BLURRED VISION: 0
HEADACHES: 0
DIZZINESS: 0
CHILLS: 0
VOMITING: 0
PHOTOPHOBIA: 0
WEAKNESS: 0
COUGH: 1
SENSORY CHANGE: 0
NAUSEA: 0
ABDOMINAL PAIN: 0
SORE THROAT: 1

## 2018-04-16 NOTE — PROGRESS NOTES
Established Patient    Ameena presents today with the following:    CC:   BP follow up    HPI:   43-year-old female with a past medical history of hypertension, hypothyroidism, hyperlipidemia who presents today for blood pressure follow-up. Patient was recently changed from Cardizem to lisinopril for blood pressure management. Patient states she notes some bilateral neck pain with radiation to the posterior aspect of her head. She states she notices this when her blood pressure is elevated or she is under stress. She denies any photophobia, focal muscle weakness, decreased neck range of motion. She also complains of itchy eyes and sore throat over the last couple days. She notices her symptoms increase when she is outside. Patient has kept a blood pressure log over the last days average around 135-140/85-90. These measurements occur both in the morning and evening. She denies any lightheadedness, dizziness, chest pain, palpitations, shortness of breath.       Patient Active Problem List    Diagnosis Date Noted   • Allergic rhinitis 04/16/2018   • Overweight (BMI 25.0-29.9) 04/02/2018   • Hypertension 03/22/2018   • Hypothyroidism 03/22/2018   • Hyperlipidemia 03/22/2018   • Hot flashes due to menopause 03/22/2018       Current Outpatient Prescriptions   Medication Sig Dispense Refill   • lisinopril (PRINIVIL) 20 MG Tab Take 1 Tab by mouth every day. 60 Tab 2   • loratadine (CLARITIN) 10 MG Tab Take 1 Tab by mouth every day. 60 Tab 2   • levothyroxine (SYNTHROID) 75 MCG Tab      • albuterol (PROVENTIL HFA) 108 (90 Base) MCG/ACT Aero Soln inhalation aerosol Inhale 2 Puffs by mouth every 6 hours as needed for Shortness of Breath.     • vitamin e (VITAMIN E) 400 UNIT Cap Take 1 Cap by mouth every day. 30 Cap 3     No current facility-administered medications for this visit.            ROS: As per HPI. Additional pertinent symptoms as noted below.  Review of Systems   Constitutional: Negative for chills and fever.  "  HENT: Positive for congestion and sore throat. Negative for hearing loss.    Eyes: Negative for blurred vision, double vision and photophobia.   Respiratory: Positive for cough.    Cardiovascular: Negative for chest pain, palpitations and leg swelling.   Gastrointestinal: Negative for abdominal pain, nausea and vomiting.   Genitourinary: Negative for dysuria and urgency.   Musculoskeletal: Negative for myalgias.   Skin: Negative for rash.   Neurological: Negative for dizziness, sensory change, weakness and headaches.   Psychiatric/Behavioral: Negative for depression.         Physical Exam     /92   Pulse 77   Temp 36.4 °C (97.6 °F)   Ht 1.473 m (4' 10\")   Wt 61.2 kg (135 lb)   SpO2 94%   BMI 28.22 kg/m²     Physical Exam   Constitutional: She is oriented to person, place, and time and well-developed, well-nourished, and in no distress.   HENT:   Head: Normocephalic and atraumatic.   Mouth/Throat: No oropharyngeal exudate.   Eyes: Conjunctivae are normal. Pupils are equal, round, and reactive to light. No scleral icterus.   Neck: Normal range of motion. Neck supple. No JVD present. No thyromegaly present.   Cardiovascular: Normal rate, regular rhythm and normal heart sounds.    Pulmonary/Chest: Effort normal and breath sounds normal. No respiratory distress. She has no wheezes.   Abdominal: Soft. Bowel sounds are normal. She exhibits no distension. There is no tenderness. There is no rebound.   Musculoskeletal: Normal range of motion. She exhibits no edema.   Neurological: She is alert and oriented to person, place, and time. No cranial nerve deficit.   Skin: No rash noted. No erythema.   Psychiatric: Affect normal.         Assessment and Plan    1. Hypertension, unspecified type  - Patient has history of hypertension, recently switched from Cardizem to lisinopril  - Blood pressure remains slightly elevated at home, averaging 135-140/85-90  - Blood pressure in clinic today elevated to 144/92  - Will " increase lisinopril to 20 mg daily, encourage patient to continue to check blood pressure at home and bring log to next visit  - Will check kidney function and electrolytes prior to next visit  - lisinopril (PRINIVIL) 20 MG Tab; Take 1 Tab by mouth every day.  Dispense: 60 Tab; Refill: 2  - BASIC METABOLIC PANEL; Future      2. Hypothyroidism, unspecified type  - Patient has history of hypothyroidism stable on medication  - Last TSH was 0.74 in 11/2017  - Will recheck TSH with reflex T4 to assess for secondary causes of hypertension  - TSH WITH REFLEX TO FT4; Future      3. Seasonal allergic rhinitis, unspecified chronicity, unspecified trigger  - Patient complaining sore throat, itchy eyes for the last few days  - Likely has allergic reaction to pollen or seasonal allergies  - Recommend over-the-counter Claritin for symptomatic relief  - loratadine (CLARITIN) 10 MG Tab; Take 1 Tab by mouth every day.  Dispense: 60 Tab; Refill: 2      Follow up: Return in about 4 weeks (around 5/14/2018).    Signed by: Carlos Bowie M.D.

## 2018-04-16 NOTE — PATIENT INSTRUCTIONS
Hypertension  Hypertension is another name for high blood pressure. High blood pressure forces your heart to work harder to pump blood. A blood pressure reading has two numbers, which includes a higher number over a lower number (example: 110/72).  Follow these instructions at home:  · Have your blood pressure rechecked by your doctor.  · Only take medicine as told by your doctor. Follow the directions carefully. The medicine does not work as well if you skip doses. Skipping doses also puts you at risk for problems.  · Do not smoke.  · Monitor your blood pressure at home as told by your doctor.  Contact a doctor if:  · You think you are having a reaction to the medicine you are taking.  · You have repeat headaches or feel dizzy.  · You have puffiness (swelling) in your ankles.  · You have trouble with your vision.  Get help right away if:  · You get a very bad headache and are confused.  · You feel weak, numb, or faint.  · You get chest or belly (abdominal) pain.  · You throw up (vomit).  · You cannot breathe very well.  This information is not intended to replace advice given to you by your health care provider. Make sure you discuss any questions you have with your health care provider.  Document Released: 06/05/2009 Document Revised: 05/25/2017 Document Reviewed: 10/10/2014  [employer] Interactive Patient Education © 2017 [employer] Inc[employer]      Please follow up with Dr. Ball at next available appointment

## 2018-05-14 ENCOUNTER — OFFICE VISIT (OUTPATIENT)
Dept: URGENT CARE | Facility: CLINIC | Age: 43
End: 2018-05-14
Payer: COMMERCIAL

## 2018-05-14 VITALS
DIASTOLIC BLOOD PRESSURE: 84 MMHG | TEMPERATURE: 97.6 F | OXYGEN SATURATION: 98 % | WEIGHT: 135 LBS | RESPIRATION RATE: 16 BRPM | HEIGHT: 58 IN | BODY MASS INDEX: 28.34 KG/M2 | SYSTOLIC BLOOD PRESSURE: 132 MMHG | HEART RATE: 87 BPM

## 2018-05-14 DIAGNOSIS — J30.1 SEASONAL ALLERGIC RHINITIS DUE TO POLLEN: ICD-10-CM

## 2018-05-14 PROCEDURE — 94640 AIRWAY INHALATION TREATMENT: CPT | Performed by: NURSE PRACTITIONER

## 2018-05-14 PROCEDURE — 99203 OFFICE O/P NEW LOW 30 MIN: CPT | Mod: 25 | Performed by: NURSE PRACTITIONER

## 2018-05-14 RX ORDER — ALBUTEROL SULFATE 2.5 MG/3ML
2.5 SOLUTION RESPIRATORY (INHALATION) ONCE
Status: COMPLETED | OUTPATIENT
Start: 2018-05-14 | End: 2018-05-14

## 2018-05-14 RX ADMIN — ALBUTEROL SULFATE 2.5 MG: 2.5 SOLUTION RESPIRATORY (INHALATION) at 11:30

## 2018-05-14 ASSESSMENT — ENCOUNTER SYMPTOMS
HEADACHES: 0
CHILLS: 0
DIARRHEA: 0
PALPITATIONS: 0
NAUSEA: 0
DIZZINESS: 0
WHEEZING: 1
VOMITING: 0
COUGH: 1
FEVER: 0
SORE THROAT: 0
MYALGIAS: 0
SHORTNESS OF BREATH: 0

## 2018-05-14 NOTE — PROGRESS NOTES
"Subjective:      Ameena Hines is a 43 y.o. female who presents with Cough (wheezing / nausea / pressure / flem x 15 days )    Chief Complaint   Patient presents with   • Cough     wheezing / nausea / pressure / flem x 15 days      Coughing, dry/wheezing  Coughing makes her feel back pressure and nausea  denies being on Claritin. Worried she cant cough anything up, cough tight and wheezy in nature.        HPI    Review of Systems   Constitutional: Negative for chills, fever and malaise/fatigue.   HENT: Negative for congestion and sore throat.    Respiratory: Positive for cough and wheezing. Negative for shortness of breath.    Cardiovascular: Negative for chest pain and palpitations.   Gastrointestinal: Negative for diarrhea, nausea and vomiting.   Genitourinary: Negative for dysuria.   Musculoskeletal: Negative for myalgias.   Skin: Negative for rash.   Neurological: Negative for dizziness and headaches.   Endo/Heme/Allergies: Positive for environmental allergies.     Past Medical History:   Diagnosis Date   • Hyperlipidemia    • Hypertension    • Thyroid disease      Family history reviewed and not related to this visit  Social History     Social History   • Marital status:      Spouse name: N/A   • Number of children: N/A   • Years of education: N/A     Occupational History   • Not on file.     Social History Main Topics   • Smoking status: Never Smoker   • Smokeless tobacco: Never Used   • Alcohol use No   • Drug use: No   • Sexual activity: Yes     Partners: Male     Other Topics Concern   • Not on file     Social History Narrative   • No narrative on file          Objective:     /84   Pulse 87   Temp 36.4 °C (97.6 °F)   Resp 16   Ht 1.473 m (4' 10\")   Wt 61.2 kg (135 lb)   SpO2 100%   BMI 28.22 kg/m²      Physical Exam   Constitutional: She is oriented to person, place, and time. She appears well-developed and well-nourished. No distress.   HENT:   Head: Normocephalic and atraumatic.   Eyes: " Conjunctivae are normal.   Cardiovascular: Normal rate.    Pulmonary/Chest: Effort normal. She has wheezes.   Tight cough   Abdominal: Soft.   Musculoskeletal: Normal range of motion.   Neurological: She is alert and oriented to person, place, and time.   Skin: Skin is warm and dry.   Psychiatric: She has a normal mood and affect. Her behavior is normal. Judgment and thought content normal.   Nursing note and vitals reviewed.       breathing improved after treatment         Assessment/Plan:     1. Seasonal allergic rhinitis due to pollen     - albuterol (PROVENTIL) 2.5mg/3ml nebulizer solution 2.5 mg; 3 mL by Nebulization route Once.    Start daily claritan  Start nasonex  Follow up with PCP  Rescue albuterol PRN  Please make an appointment for follow up with your primary care provider or make appointment to establish with a primary care. Return for any perception of change in condition, worsening of symptoms, such as perception of worse pain, worsening fever, not getting better, or any other concerns. Please go to the nearest emergency room for any shortness of breath or chest pain or for any of the emergent conditions we have discussed. Patient and/or family states understanding to plan of care, and discharge instructions. Different diagnosis were discussed and signs/symptoms were discussed to educate on.

## 2018-05-18 ENCOUNTER — HOSPITAL ENCOUNTER (OUTPATIENT)
Dept: LAB | Facility: MEDICAL CENTER | Age: 43
End: 2018-05-18
Attending: STUDENT IN AN ORGANIZED HEALTH CARE EDUCATION/TRAINING PROGRAM
Payer: COMMERCIAL

## 2018-05-18 DIAGNOSIS — E03.9 HYPOTHYROIDISM, UNSPECIFIED TYPE: ICD-10-CM

## 2018-05-18 DIAGNOSIS — I10 HYPERTENSION, UNSPECIFIED TYPE: ICD-10-CM

## 2018-05-18 LAB
ANION GAP SERPL CALC-SCNC: 10 MMOL/L (ref 0–11.9)
BUN SERPL-MCNC: 14 MG/DL (ref 8–22)
CALCIUM SERPL-MCNC: 9.9 MG/DL (ref 8.5–10.5)
CHLORIDE SERPL-SCNC: 105 MMOL/L (ref 96–112)
CO2 SERPL-SCNC: 25 MMOL/L (ref 20–33)
CREAT SERPL-MCNC: 0.56 MG/DL (ref 0.5–1.4)
GLUCOSE SERPL-MCNC: 83 MG/DL (ref 65–99)
POTASSIUM SERPL-SCNC: 4 MMOL/L (ref 3.6–5.5)
SODIUM SERPL-SCNC: 140 MMOL/L (ref 135–145)
T4 FREE SERPL-MCNC: 1.02 NG/DL (ref 0.53–1.43)
TSH SERPL DL<=0.005 MIU/L-ACNC: 0.32 UIU/ML (ref 0.38–5.33)

## 2018-05-18 PROCEDURE — 84443 ASSAY THYROID STIM HORMONE: CPT

## 2018-05-18 PROCEDURE — 36415 COLL VENOUS BLD VENIPUNCTURE: CPT

## 2018-05-18 PROCEDURE — 84439 ASSAY OF FREE THYROXINE: CPT

## 2018-05-18 PROCEDURE — 80048 BASIC METABOLIC PNL TOTAL CA: CPT

## 2018-05-23 ENCOUNTER — OFFICE VISIT (OUTPATIENT)
Dept: INTERNAL MEDICINE | Facility: MEDICAL CENTER | Age: 43
End: 2018-05-23
Payer: COMMERCIAL

## 2018-05-23 VITALS
DIASTOLIC BLOOD PRESSURE: 78 MMHG | HEIGHT: 58 IN | TEMPERATURE: 97.7 F | BODY MASS INDEX: 28.13 KG/M2 | OXYGEN SATURATION: 98 % | HEART RATE: 80 BPM | WEIGHT: 134 LBS | SYSTOLIC BLOOD PRESSURE: 122 MMHG

## 2018-05-23 DIAGNOSIS — I10 ESSENTIAL HYPERTENSION: ICD-10-CM

## 2018-05-23 DIAGNOSIS — J30.89 SEASONAL ALLERGIC RHINITIS DUE TO OTHER ALLERGIC TRIGGER: ICD-10-CM

## 2018-05-23 DIAGNOSIS — T88.7XXA SIDE EFFECT OF MEDICATION: ICD-10-CM

## 2018-05-23 PROCEDURE — 99213 OFFICE O/P EST LOW 20 MIN: CPT | Mod: GE | Performed by: INTERNAL MEDICINE

## 2018-05-23 RX ORDER — LOSARTAN POTASSIUM 25 MG/1
25 TABLET ORAL DAILY
Qty: 30 TAB | Refills: 1 | Status: SHIPPED | OUTPATIENT
Start: 2018-05-23 | End: 2018-07-25 | Stop reason: SDUPTHER

## 2018-05-23 NOTE — PATIENT INSTRUCTIONS
Allergic Rhinitis  Allergic rhinitis is when the mucous membranes in the nose respond to allergens. Allergens are particles in the air that cause your body to have an allergic reaction. This causes you to release allergic antibodies. Through a chain of events, these eventually cause you to release histamine into the blood stream. Although meant to protect the body, it is this release of histamine that causes your discomfort, such as frequent sneezing, congestion, and an itchy, runny nose.  What are the causes?  Seasonal allergic rhinitis (hay fever) is caused by pollen allergens that may come from grasses, trees, and weeds. Year-round allergic rhinitis (perennial allergic rhinitis) is caused by allergens such as house dust mites, pet dander, and mold spores.  What are the signs or symptoms?  · Nasal stuffiness (congestion).  · Itchy, runny nose with sneezing and tearing of the eyes.  How is this diagnosed?  Your health care provider can help you determine the allergen or allergens that trigger your symptoms. If you and your health care provider are unable to determine the allergen, skin or blood testing may be used. Your health care provider will diagnose your condition after taking your health history and performing a physical exam. Your health care provider may assess you for other related conditions, such as asthma, pink eye, or an ear infection.  How is this treated?  Allergic rhinitis does not have a cure, but it can be controlled by:  · Medicines that block allergy symptoms. These may include allergy shots, nasal sprays, and oral antihistamines.  · Avoiding the allergen.  Hay fever may often be treated with antihistamines in pill or nasal spray forms. Antihistamines block the effects of histamine. There are over-the-counter medicines that may help with nasal congestion and swelling around the eyes. Check with your health care provider before taking or giving this medicine.  If avoiding the allergen or the  medicine prescribed do not work, there are many new medicines your health care provider can prescribe. Stronger medicine may be used if initial measures are ineffective. Desensitizing injections can be used if medicine and avoidance does not work. Desensitization is when a patient is given ongoing shots until the body becomes less sensitive to the allergen. Make sure you follow up with your health care provider if problems continue.  Follow these instructions at home:  It is not possible to completely avoid allergens, but you can reduce your symptoms by taking steps to limit your exposure to them. It helps to know exactly what you are allergic to so that you can avoid your specific triggers.  Contact a health care provider if:  · You have a fever.  · You develop a cough that does not stop easily (persistent).  · You have shortness of breath.  · You start wheezing.  · Symptoms interfere with normal daily activities.  This information is not intended to replace advice given to you by your health care provider. Make sure you discuss any questions you have with your health care provider.  Document Released: 09/12/2002 Document Revised: 08/18/2017 Document Reviewed: 08/25/2014  [employer] Interactive Patient Education © 2017 [employer]  Lisinopril tablets  What is this medicine?  LISINOPRIL (lyse IN oh pril) is an ACE inhibitor. This medicine is used to treat high blood pressure and heart failure. It is also used to protect the heart immediately after a heart attack.  This medicine may be used for other purposes; ask your health care provider or pharmacist if you have questions.  COMMON BRAND NAME(S): Prinivil, Zestril  What should I tell my health care provider before I take this medicine?  They need to know if you have any of these conditions:  -diabetes  -heart or blood vessel disease  -kidney disease  -low blood pressure  -previous swelling of the tongue, face, or lips with difficulty breathing, difficulty  swallowing, hoarseness, or tightening of the throat  -an unusual or allergic reaction to lisinopril, other ACE inhibitors, insect venom, foods, dyes, or preservatives  -pregnant or trying to get pregnant  -breast-feeding  How should I use this medicine?  Take this medicine by mouth with a glass of water. Follow the directions on your prescription label. You may take this medicine with or without food. If it upsets your stomach, take it with food. Take your medicine at regular intervals. Do not take it more often than directed. Do not stop taking except on your doctor's advice.  Talk to your pediatrician regarding the use of this medicine in children. Special care may be needed. While this drug may be prescribed for children as young as 6 years of age for selected conditions, precautions do apply.  Overdosage: If you think you have taken too much of this medicine contact a poison control center or emergency room at once.  NOTE: This medicine is only for you. Do not share this medicine with others.  What if I miss a dose?  If you miss a dose, take it as soon as you can. If it is almost time for your next dose, take only that dose. Do not take double or extra doses.  What may interact with this medicine?  Do not take this medicine with any of the following medications:  -hymenoptera venom  -sacubitril; valsartan  This medicines may also interact with the following medications:  -aliskiren  -angiotensin receptor blockers, like losartan or valsartan  -certain medicines for diabetes  -diuretics  -everolimus  -gold compounds  -lithium  -NSAIDs, medicines for pain and inflammation, like ibuprofen or naproxen  -potassium salts or supplements  -salt substitutes  -sirolimus  -temsirolimus  This list may not describe all possible interactions. Give your health care provider a list of all the medicines, herbs, non-prescription drugs, or dietary supplements you use. Also tell them if you smoke, drink alcohol, or use illegal  drugs. Some items may interact with your medicine.  What should I watch for while using this medicine?  Visit your doctor or health care professional for regular check ups. Check your blood pressure as directed. Ask your doctor what your blood pressure should be, and when you should contact him or her.  Do not treat yourself for coughs, colds, or pain while you are using this medicine without asking your doctor or health care professional for advice. Some ingredients may increase your blood pressure.  Women should inform their doctor if they wish to become pregnant or think they might be pregnant. There is a potential for serious side effects to an unborn child. Talk to your health care professional or pharmacist for more information.  Check with your doctor or health care professional if you get an attack of severe diarrhea, nausea and vomiting, or if you sweat a lot. The loss of too much body fluid can make it dangerous for you to take this medicine.  You may get drowsy or dizzy. Do not drive, use machinery, or do anything that needs mental alertness until you know how this drug affects you. Do not stand or sit up quickly, especially if you are an older patient. This reduces the risk of dizzy or fainting spells. Alcohol can make you more drowsy and dizzy. Avoid alcoholic drinks.  Avoid salt substitutes unless you are told otherwise by your doctor or health care professional.  What side effects may I notice from receiving this medicine?  Side effects that you should report to your doctor or health care professional as soon as possible:  -allergic reactions like skin rash, itching or hives, swelling of the hands, feet, face, lips, throat, or tongue  -breathing problems  -signs and symptoms of kidney injury like trouble passing urine or change in the amount of urine  -signs and symptoms of increased potassium like muscle weakness; chest pain; or fast, irregular heartbeat  -signs and symptoms of liver injury like dark  yellow or brown urine; general ill feeling or flu-like symptoms; light-colored stools; loss of appetite; nausea; right upper belly pain; unusually weak or tired; yellowing of the eyes or skin  -signs and symptoms of low blood pressure like dizziness; feeling faint or lightheaded, falls; unusually weak or tired  -stomach pain with or without nausea and vomiting  Side effects that usually do not require medical attention (report to your doctor or health care professional if they continue or are bothersome):  -changes in taste  -cough  -dizziness  -fever  -headache  -sensitivity to light  This list may not describe all possible side effects. Call your doctor for medical advice about side effects. You may report side effects to FDA at 9-844-FDA-1210.  Where should I keep my medicine?  Keep out of the reach of children.  Store at room temperature between 15 and 30 degrees C (59 and 86 degrees F). Protect from moisture. Keep container tightly closed. Throw away any unused medicine after the expiration date.  NOTE: This sheet is a summary. It may not cover all possible information. If you have questions about this medicine, talk to your doctor, pharmacist, or health care provider.  © 2018 [employer] (2017-02-06 12:52:35)

## 2018-05-23 NOTE — PROGRESS NOTES
Established Patient    Ameena presents today with the following:    CC: cough    HPI: 43 yr old female patient with PMHx of HTN, hypothyroidism, HLD comes in for evaluation of cough.      # HTN:Diagnosed many years back and was on cardizem.Switched to lisinopril and dose was increased to 20 mg for better control of blood pressure. For the past 1-2 months since lisinopril was started she began to have cough and was seen in the office and urgent care recently for evaluation of cough associated with minimal sputum production. Was given claritin for seasonal allergic rhinitis because she had itchy eyes and sore throat initially.Unable to cough anything up and feels chest pain on the lower side because of excessive coughing. Denies fever, chills, nausea, vomiting, abdominal pain or any sick contacts.With current lisinopril dose BP is better control.       Patient Active Problem List    Diagnosis Date Noted   • Allergic rhinitis 04/16/2018   • Overweight (BMI 25.0-29.9) 04/02/2018   • Hypertension 03/22/2018   • Hypothyroidism 03/22/2018   • Hyperlipidemia 03/22/2018   • Hot flashes due to menopause 03/22/2018       Current Outpatient Prescriptions   Medication Sig Dispense Refill   • losartan (COZAAR) 25 MG Tab Take 1 Tab by mouth every day. 30 Tab 1   • loratadine (CLARITIN) 10 MG Tab Take 1 Tab by mouth every day. 60 Tab 2   • levothyroxine (SYNTHROID) 75 MCG Tab      • vitamin e (VITAMIN E) 400 UNIT Cap Take 1 Cap by mouth every day. 30 Cap 3   • albuterol (PROVENTIL HFA) 108 (90 Base) MCG/ACT Aero Soln inhalation aerosol Inhale 2 Puffs by mouth every 6 hours as needed for Shortness of Breath.       No current facility-administered medications for this visit.        Social History     Social History   • Marital status:      Spouse name: N/A   • Number of children: N/A   • Years of education: N/A     Occupational History   • Not on file.     Social History Main Topics   • Smoking status: Never Smoker   •  "Smokeless tobacco: Never Used   • Alcohol use No   • Drug use: No   • Sexual activity: Yes     Partners: Male     Other Topics Concern   • Not on file     Social History Narrative   • No narrative on file       Family History   Problem Relation Age of Onset   • Hypertension Mother    • Hyperlipidemia Mother        ROS: As per HPI. Additional pertinent symptoms as noted below.    Constitutional: Denies fever/chills/weight changes.   Eyes: Denies changes/pain in vision  ENT: Denies sore throat/congestion/ear ache.   Cardiovascular: Denies chest pain /palpitations/edema.   Respiratory: Denies SOB/PND/orthopnea.+cough  Abdomen: Denies difficulty swallowing/ diarrhea/constipation/abdominal pain/nausea/vomiting  Genitourinary: Normal urinary habits.   Musculo-skeletal: normal ambulation.Denies muscle or joint pains.  Skin: Denies rash/lesions.  Neurological: Denies weakness/tingling/numbness/headache  Psychological: good mood and cooperative. Denies anxiety /depression       /78   Pulse 80   Temp 36.5 °C (97.7 °F)   Ht 1.473 m (4' 10\")   Wt 60.8 kg (134 lb)   SpO2 98%   Breastfeeding? No   BMI 28.01 kg/m²     Physical Exam  General:  Alert and oriented, No apparent distress.    Eyes: Pupils equal and reactive. No scleral icterus.    Throat: Clear no erythema or exudates noted.    Neck: Supple. No lymphadenopathy noted. Thyroid not enlarged.    Lungs: Clear to auscultation and percussion bilaterally.    Cardiovascular: Regular rate and rhythm. No murmurs, rubs or gallops.    Abdomen:  Benign. No rebound or guarding noted.    Extremities: No clubbing, cyanosis, edema.    Skin: Clear. No rash or suspicious skin lesions noted.    Neurological: Oriented to time, place, and person .Cranial nerves intact. No motor/sensory deficits.Reflexes were normal and symmetrical in both upper and lower extremities     Musculoskeletal : NROM of all extremities. No tenderness or deformity noted.     Note: I have reviewed all " pertinent labs and diagnostic tests associated with this visit with specific comments listed under the assessment and plan below      Assessment and Plan    1. Essential hypertension  2. Side effect of medication  - today BP is 122/78 and denies any related symptoms except for cough which is dry and minimal sputum production worsened lately.  - currently on lisinopril 20 mg PO once daily and likely causing the symptoms of cough continuously for the past 1- 2 months since lisinopril was started.  -Stop lisinopril and switch to losartan 25 mg by mouth once daily  -Ordered CBC with differential  -Will follow-up in 2 weeks    3. Seasonal allergic rhinitis due to other allergic trigger  -Advised to continue Claritin, albuterol as needed  -Advised to avoid allergic triggers    Follow-up in 2 weeks or earlier if any acute issues.    Signed by: Mani Ball M.D.

## 2018-06-01 ENCOUNTER — HOSPITAL ENCOUNTER (OUTPATIENT)
Dept: LAB | Facility: MEDICAL CENTER | Age: 43
End: 2018-06-01
Attending: INTERNAL MEDICINE
Payer: COMMERCIAL

## 2018-06-01 DIAGNOSIS — T88.7XXA SIDE EFFECT OF MEDICATION: ICD-10-CM

## 2018-06-01 LAB
BASOPHILS # BLD AUTO: 1.2 % (ref 0–1.8)
BASOPHILS # BLD: 0.06 K/UL (ref 0–0.12)
EOSINOPHIL # BLD AUTO: 0.31 K/UL (ref 0–0.51)
EOSINOPHIL NFR BLD: 6.3 % (ref 0–6.9)
ERYTHROCYTE [DISTWIDTH] IN BLOOD BY AUTOMATED COUNT: 39.8 FL (ref 35.9–50)
HCT VFR BLD AUTO: 41.3 % (ref 37–47)
HGB BLD-MCNC: 13.2 G/DL (ref 12–16)
IMM GRANULOCYTES # BLD AUTO: 0 K/UL (ref 0–0.11)
IMM GRANULOCYTES NFR BLD AUTO: 0 % (ref 0–0.9)
LYMPHOCYTES # BLD AUTO: 2.55 K/UL (ref 1–4.8)
LYMPHOCYTES NFR BLD: 52 % (ref 22–41)
MCH RBC QN AUTO: 24.6 PG (ref 27–33)
MCHC RBC AUTO-ENTMCNC: 32 G/DL (ref 33.6–35)
MCV RBC AUTO: 77.1 FL (ref 81.4–97.8)
MONOCYTES # BLD AUTO: 0.35 K/UL (ref 0–0.85)
MONOCYTES NFR BLD AUTO: 7.1 % (ref 0–13.4)
NEUTROPHILS # BLD AUTO: 1.63 K/UL (ref 2–7.15)
NEUTROPHILS NFR BLD: 33.4 % (ref 44–72)
NRBC # BLD AUTO: 0 K/UL
NRBC BLD-RTO: 0 /100 WBC
PLATELET # BLD AUTO: 261 K/UL (ref 164–446)
PMV BLD AUTO: 11.6 FL (ref 9–12.9)
RBC # BLD AUTO: 5.36 M/UL (ref 4.2–5.4)
WBC # BLD AUTO: 4.9 K/UL (ref 4.8–10.8)

## 2018-06-01 PROCEDURE — 36415 COLL VENOUS BLD VENIPUNCTURE: CPT

## 2018-06-01 PROCEDURE — 85025 COMPLETE CBC W/AUTO DIFF WBC: CPT

## 2018-06-08 ENCOUNTER — OFFICE VISIT (OUTPATIENT)
Dept: INTERNAL MEDICINE | Facility: MEDICAL CENTER | Age: 43
End: 2018-06-08
Payer: COMMERCIAL

## 2018-06-08 VITALS
WEIGHT: 134 LBS | HEART RATE: 70 BPM | BODY MASS INDEX: 28.13 KG/M2 | HEIGHT: 58 IN | DIASTOLIC BLOOD PRESSURE: 85 MMHG | SYSTOLIC BLOOD PRESSURE: 125 MMHG | OXYGEN SATURATION: 97 % | TEMPERATURE: 97.8 F

## 2018-06-08 DIAGNOSIS — E03.9 HYPOTHYROIDISM, UNSPECIFIED TYPE: ICD-10-CM

## 2018-06-08 DIAGNOSIS — I10 ESSENTIAL HYPERTENSION: ICD-10-CM

## 2018-06-08 PROCEDURE — 99213 OFFICE O/P EST LOW 20 MIN: CPT | Mod: GE | Performed by: INTERNAL MEDICINE

## 2018-06-08 RX ORDER — LEVOTHYROXINE SODIUM 0.05 MG/1
50 TABLET ORAL
Qty: 30 TAB | Refills: 3 | Status: SHIPPED | OUTPATIENT
Start: 2018-06-08 | End: 2018-10-04 | Stop reason: SDUPTHER

## 2018-06-08 NOTE — PATIENT INSTRUCTIONS
Hypothyroidism  Hypothyroidism is a disorder of the thyroid. The thyroid is a large gland that is located in the lower front of the neck. The thyroid releases hormones that control how the body works. With hypothyroidism, the thyroid does not make enough of these hormones.  What are the causes?  Causes of hypothyroidism may include:  · Viral infections.  · Pregnancy.  · Your own defense system (immune system) attacking your thyroid.  · Certain medicines.  · Birth defects.  · Past radiation treatments to your head or neck.  · Past treatment with radioactive iodine.  · Past surgical removal of part or all of your thyroid.  · Problems with the gland that is located in the center of your brain (pituitary).  What are the signs or symptoms?  Signs and symptoms of hypothyroidism may include:  · Feeling as though you have no energy (lethargy).  · Inability to tolerate cold.  · Weight gain that is not explained by a change in diet or exercise habits.  · Dry skin.  · Coarse hair.  · Menstrual irregularity.  · Slowing of thought processes.  · Constipation.  · Sadness or depression.  How is this diagnosed?  Your health care provider may diagnose hypothyroidism with blood tests and ultrasound tests.  How is this treated?  Hypothyroidism is treated with medicine that replaces the hormones that your body does not make. After you begin treatment, it may take several weeks for symptoms to go away.  Follow these instructions at home:  · Take medicines only as directed by your health care provider.  · If you start taking any new medicines, tell your health care provider.  · Keep all follow-up visits as directed by your health care provider. This is important. As your condition improves, your dosage needs may change. You will need to have blood tests regularly so that your health care provider can watch your condition.  Contact a health care provider if:  · Your symptoms do not get better with treatment.  · You are taking thyroid  replacement medicine and:  ¨ You sweat excessively.  ¨ You have tremors.  ¨ You feel anxious.  ¨ You lose weight rapidly.  ¨ You cannot tolerate heat.  ¨ You have emotional swings.  ¨ You have diarrhea.  ¨ You feel weak.  Get help right away if:  · You develop chest pain.  · You develop an irregular heartbeat.  · You develop a rapid heartbeat.  This information is not intended to replace advice given to you by your health care provider. Make sure you discuss any questions you have with your health care provider.  Document Released: 12/18/2006 Document Revised: 05/25/2017 Document Reviewed: 05/05/2015  [email or web address] Interactive Patient Education © 2017 [employer]  Levothyroxine tablets  What is this medicine?  LEVOTHYROXINE (frandy voe thye CARMEL een) is a thyroid hormone. This medicine can improve symptoms of thyroid deficiency such as slow speech, lack of energy, weight gain, hair loss, dry skin, and feeling cold. It also helps to treat goiter (an enlarged thyroid gland). It is also used to treat some kinds of thyroid cancer along with surgery and other medicines.  This medicine may be used for other purposes; ask your health care provider or pharmacist if you have questions.  COMMON BRAND NAME(S): Estre, Levo-T, Levothroid, Levoxyl, Synthroid, Thyro-Tabs, Unithroid  What should I tell my health care provider before I take this medicine?  They need to know if you have any of these conditions:  -angina  -blood clotting problems  -diabetes  -dieting or on a weight loss program  -fertility problems  -heart disease  -high levels of thyroid hormone  -pituitary gland problem  -previous heart attack  -an unusual or allergic reaction to levothyroxine, thyroid hormones, other medicines, foods, dyes, or preservatives  -pregnant or trying to get pregnant  -breast-feeding  How should I use this medicine?  Take this medicine by mouth with plenty of water. It is best to take on an empty stomach, at least 30 minutes before or 2 hours  after food. Follow the directions on the prescription label. Take at the same time each day. Do not take your medicine more often than directed.  Contact your pediatrician regarding the use of this medicine in children. While this drug may be prescribed for children and infants as young as a few days of age for selected conditions, precautions do apply. For infants, you may crush the tablet and place in a small amount of (5-10 ml or 1 to 2 teaspoonfuls) of water, breast milk, or non-soy based infant formula. Do not mix with soy-based infant formula. Give as directed.  Overdosage: If you think you have taken too much of this medicine contact a poison control center or emergency room at once.  NOTE: This medicine is only for you. Do not share this medicine with others.  What if I miss a dose?  If you miss a dose, take it as soon as you can. If it is almost time for your next dose, take only that dose. Do not take double or extra doses.  What may interact with this medicine?  -amiodarone  -antacids  -anti-thyroid medicines  -calcium supplements  -carbamazepine  -cholestyramine  -colestipol  -digoxin  -female hormones, including contraceptive or birth control pills  -iron supplements  -ketamine  -liquid nutrition products like Ensure  -medicines for colds and breathing difficulties  -medicines for diabetes  -medicines for mental depression  -medicines or herbals used to decrease weight or appetite  -phenobarbital or other barbiturate medications  -phenytoin  -prednisone or other corticosteroids  -rifabutin  -rifampin  -soy isoflavones  -sucralfate  -theophylline  -warfarin  This list may not describe all possible interactions. Give your health care provider a list of all the medicines, herbs, non-prescription drugs, or dietary supplements you use. Also tell them if you smoke, drink alcohol, or use illegal drugs. Some items may interact with your medicine.  What should I watch for while using this medicine?  Be sure to  take this medicine with plenty of fluids. Some tablets may cause choking, gagging, or difficulty swallowing from the tablet getting stuck in your throat. Most of these problems disappear if the medicine is taken with the right amount of water or other fluids.  Do not switch brands of this medicine unless your health care professional agrees with the change. Ask questions if you are uncertain.  You will need regular exams and occasional blood tests to check the response to treatment. If you are receiving this medicine for an underactive thyroid, it may be several weeks before you notice an improvement. Check with your doctor or health care professional if your symptoms do not improve.  It may be necessary for you to take this medicine for the rest of your life. Do not stop using this medicine unless your doctor or health care professional advises you to.  This medicine can affect blood sugar levels. If you have diabetes, check your blood sugar as directed.  You may lose some of your hair when you first start treatment. With time, this usually corrects itself.  If you are going to have surgery, tell your doctor or health care professional that you are taking this medicine.  What side effects may I notice from receiving this medicine?  Side effects that you should report to your doctor or health care professional as soon as possible:  -allergic reactions like skin rash, itching or hives, swelling of the face, lips, or tongue  -chest pain  -excessive sweating or intolerance to heat  -fast or irregular heartbeat  -nervousness  -skin rash or hives  -swelling of ankles, feet, or legs  -tremors  Side effects that usually do not require medical attention (report to your doctor or health care professional if they continue or are bothersome):  -changes in appetite  -changes in menstrual periods  -diarrhea  -hair loss  -headache  -trouble sleeping  -weight loss  This list may not describe all possible side effects. Call your  doctor for medical advice about side effects. You may report side effects to FDA at 1-746-BOY-5014.  Where should I keep my medicine?  Keep out of the reach of children.  Store at room temperature between 15 and 30 degrees C (59 and 86 degrees F). Protect from light and moisture. Keep container tightly closed. Throw away any unused medicine after the expiration date.  NOTE: This sheet is a summary. It may not cover all possible information. If you have questions about this medicine, talk to your doctor, pharmacist, or health care provider.  © 2018 Elsevier/Gold Standard (2010-03-26 14:28:07)

## 2018-06-08 NOTE — PROGRESS NOTES
Established Patient    Ameena presents today with the following:    CC: f/u cough    HPI: 43 yr old female patient with PMHx of HTN, hypothyroidism, HLD comes in for follow up of cough which was experiencing until last visit.      # HTN:Diagnosed many years back and was on cardizem.Switched to lisinopril and dose was increased to 20 mg for better control of blood pressure. For the past few months since lisinopril was started she began to have cough and was seen in the office and urgent care for evaluation of cough associated with minimal sputum production. Was given claritin for seasonal allergic rhinitis because she had itchy eyes and sore throat initially.Unable to cough anything up and felt chest pain on the lower side because of excessive coughing. Denies fever, chills, nausea, vomiting, abdominal pain or any sick contacts.Lisinopril was switched to losartan last visit. She feels her symptoms are better today.    # HLD: Diagnosed about 1 yr back and on pravastatin but was stopped after recent lipid panel.Denies any related symptoms.     # Hypothyroidism: Diagnosed about 6yrs back. Currently on levothyroxine 75 mcg PO once daily. Denies related symptoms. Prior TSH in November 0.743. Recent TSH is 0.32.     # Hot flashes: Patient stated her menstrual cycles stopped about few months back. Since then she has been having hot flashes. But these symptoms does not limit her daily activities. Was placed on vitamin E supplementation and she feels her symptoms are improved.      Patient Active Problem List    Diagnosis Date Noted   • Allergic rhinitis 04/16/2018   • Overweight (BMI 25.0-29.9) 04/02/2018   • Hypertension 03/22/2018   • Hypothyroidism 03/22/2018   • Hyperlipidemia 03/22/2018   • Hot flashes due to menopause 03/22/2018       Current Outpatient Prescriptions   Medication Sig Dispense Refill   • levothyroxine (SYNTHROID) 50 MCG Tab Take 1 Tab by mouth Every morning on an empty stomach. 30 Tab 3   • losartan  "(COZAAR) 25 MG Tab Take 1 Tab by mouth every day. 30 Tab 1   • loratadine (CLARITIN) 10 MG Tab Take 1 Tab by mouth every day. 60 Tab 2   • albuterol (PROVENTIL HFA) 108 (90 Base) MCG/ACT Aero Soln inhalation aerosol Inhale 2 Puffs by mouth every 6 hours as needed for Shortness of Breath.     • vitamin e (VITAMIN E) 400 UNIT Cap Take 1 Cap by mouth every day. 30 Cap 3     No current facility-administered medications for this visit.        Social History     Social History   • Marital status:      Spouse name: N/A   • Number of children: N/A   • Years of education: N/A     Occupational History   • Not on file.     Social History Main Topics   • Smoking status: Never Smoker   • Smokeless tobacco: Never Used   • Alcohol use No   • Drug use: No   • Sexual activity: Yes     Partners: Male     Other Topics Concern   • Not on file     Social History Narrative   • No narrative on file       Family History   Problem Relation Age of Onset   • Hypertension Mother    • Hyperlipidemia Mother        ROS: As per HPI. Additional pertinent symptoms as noted below.    Constitutional: Denies fever/chills/weight changes.   Eyes: Denies changes/pain in vision  ENT: Denies sore throat/congestion/ear ache.   Cardiovascular: Denies chest pain /palpitations/edema.   Respiratory: Denies SOB/cough/PND/orthopnea  Abdomen: Denies difficulty swallowing/ diarrhea/constipation/abdominal pain/nausea/vomiting  Genitourinary: Normal urinary habits.   Musculo-skeletal: normal ambulation.Denies muscle or joint pains.  Skin: Denies rash/lesions.  Neurological: Denies weakness/tingling/numbness/headache  Psychological: good mood and cooperative. Denies anxiety /depression       /85   Pulse 70   Temp 36.6 °C (97.8 °F)   Ht 1.473 m (4' 10\")   Wt 60.8 kg (134 lb)   SpO2 97%   BMI 28.01 kg/m²     Physical Exam  General:  Alert and oriented, No apparent distress.    Eyes: Pupils equal and reactive. No scleral icterus.    Throat: Clear no " erythema or exudates noted.    Neck: Supple. No lymphadenopathy noted. Thyroid not enlarged.    Lungs: Clear to auscultation and percussion bilaterally.    Cardiovascular: Regular rate and rhythm. No murmurs, rubs or gallops.    Abdomen:  Benign. No rebound or guarding noted.    Extremities: No clubbing, cyanosis, edema.    Skin: Clear. No rash or suspicious skin lesions noted.    Neurological: Oriented to time, place, and person .Cranial nerves intact. No motor/sensory deficits.Reflexes were normal and symmetrical in both upper and lower extremities     Musculoskeletal : NROM of all extremities. No tenderness or deformity noted.     Note: I have reviewed all pertinent labs and diagnostic tests associated with this visit with specific comments listed under the assessment and plan below    Assessment and Plan    1. Essential hypertension  - today BP is 125/85 and denies any related symptoms.  -Stop lisinopril and switch to losartan 25 mg by mouth once daily last visit and her cough improved.  -Advised to continue Claritin, albuterol as needed and avoid allergic triggers for allergic rhinitis symptoms.     2. Hypothyroidism, unspecified type  - currently on levothyroxine 75 mcg PO once daily   - diagnosed 6 yrs years back  - denies any related symptoms  - Her recent TSH showed 0.32 and decreased the dose to 50mcg PO once daily  - will repeat the TSH level in 3 months     3. Elevated LDL  - diagnosed about 1-2 yr back  - denies any related symptoms  - was on pravastatin 10 mg PO once daily in the past  - prior lipid panel in November- showed total cholest-214, trigly- 204, HDL 57, VLDL 41 and  and she was not on statin. ASCVD risk 1.4%  - repeat lipid panel showed following and advised to discontinue pravastatin     Ref. Range 4/5/2018 10:05   Cholesterol,Tot Latest Ref Range: 100 - 199 mg/dL 188   Triglycerides Latest Ref Range: 0 - 149 mg/dL 151 (H)   HDL Latest Ref Range: >=40 mg/dL 56   LDL Latest Ref Range:  <100 mg/dL 102 (H)     - will repeat the level for next visit.      Signed by: Mani Ball M.D.

## 2018-08-03 ENCOUNTER — OFFICE VISIT (OUTPATIENT)
Dept: INTERNAL MEDICINE | Facility: MEDICAL CENTER | Age: 43
End: 2018-08-03
Payer: COMMERCIAL

## 2018-08-03 VITALS
DIASTOLIC BLOOD PRESSURE: 86 MMHG | WEIGHT: 136.25 LBS | HEIGHT: 58 IN | BODY MASS INDEX: 28.6 KG/M2 | SYSTOLIC BLOOD PRESSURE: 142 MMHG | TEMPERATURE: 98.1 F | HEART RATE: 79 BPM | OXYGEN SATURATION: 98 %

## 2018-08-03 DIAGNOSIS — I10 ESSENTIAL HYPERTENSION: ICD-10-CM

## 2018-08-03 DIAGNOSIS — M76.31 ILIOTIBIAL BAND SYNDROME, RIGHT LEG: ICD-10-CM

## 2018-08-03 DIAGNOSIS — R05.9 COUGH IN ADULT: ICD-10-CM

## 2018-08-03 PROCEDURE — 99213 OFFICE O/P EST LOW 20 MIN: CPT | Mod: GE | Performed by: INTERNAL MEDICINE

## 2018-08-03 RX ORDER — BUDESONIDE AND FORMOTEROL FUMARATE DIHYDRATE 80; 4.5 UG/1; UG/1
2 AEROSOL RESPIRATORY (INHALATION) 2 TIMES DAILY
Qty: 1 INHALER | Refills: 3 | Status: CANCELLED | OUTPATIENT
Start: 2018-08-03

## 2018-08-03 RX ORDER — CHLORTHALIDONE 25 MG/1
25 TABLET ORAL DAILY
Qty: 30 TAB | Refills: 3 | Status: SHIPPED | OUTPATIENT
Start: 2018-08-03 | End: 2018-12-06 | Stop reason: SDUPTHER

## 2018-08-03 NOTE — PROGRESS NOTES
Established Patient    Ameena presents today with the following:    CC: chronic cough, right leg pain, HTN follow up    HPI:  42 y/o female with past medical history of HTN, hypothyroidism, and DLD presents with complaint of cough associated with mild sensation of shortness of breath and right leg pain.     Patient complains of dry cough for the past 3-4 months and recently feel like she has a dry throat and mouth. She also describes mild subjective shortness of breath with no limitation in activity for the past 2 weeks. She also feels like her chest is congested. Denies runny/stuffy nose, PND, sputum production, constitutional symptoms, childhood asthma. She gives h/o intermittent heartburn.   She feels she has some upper back pain that can affect her breathing and improves with a back massage.   Patient was switched from lisinopril to losartan in 5/2018 due to dry cough.     Patient complains of intermittent, sporadic, right lateral leg (from hip to slightly below knee) aching pain that can sometimes occur after sitting for a while or sometimes after working. She also has right knee pain. This is intermittent in nature and does not usually require any medication for relief.     Patient Active Problem List    Diagnosis Date Noted   • Allergic rhinitis 04/16/2018   • Overweight (BMI 25.0-29.9) 04/02/2018   • Hypertension 03/22/2018   • Hypothyroidism 03/22/2018   • Hyperlipidemia 03/22/2018   • Hot flashes due to menopause 03/22/2018       Current Outpatient Prescriptions   Medication Sig Dispense Refill   • chlorthalidone (HYGROTON) 25 MG Tab Take 1 Tab by mouth every day. 30 Tab 3   • losartan (COZAAR) 25 MG Tab TAKE ONE TABLET BY MOUTH ONE TIME DAILY  30 Tab 3   • levothyroxine (SYNTHROID) 50 MCG Tab Take 1 Tab by mouth Every morning on an empty stomach. 30 Tab 3   • albuterol (PROVENTIL HFA) 108 (90 Base) MCG/ACT Aero Soln inhalation aerosol Inhale 2 Puffs by mouth every 6 hours as needed for Shortness of  "Breath.     • vitamin e (VITAMIN E) 400 UNIT Cap Take 1 Cap by mouth every day. 30 Cap 3   • loratadine (CLARITIN) 10 MG Tab Take 1 Tab by mouth every day. (Patient not taking: Reported on 8/3/2018) 60 Tab 2     No current facility-administered medications for this visit.        ROS: As per HPI. Additional pertinent symptoms as noted below.    Musculo-skeletal: low back pain  All others negative   Mild low back pain with activity and lifting, improved with rest    /86   Pulse 79   Temp 36.7 °C (98.1 °F)   Ht 1.473 m (4' 10\")   Wt 61.8 kg (136 lb 4 oz)   SpO2 98%   BMI 28.48 kg/m²     Physical Exam   Constitutional:  oriented to person, place, and time. No distress.   Eyes: Pupils are equal, round, and reactive to light. No scleral icterus.  Neck: Neck supple. No thyromegaly present.   Cardiovascular: Normal rate, regular rhythm and normal heart sounds.  Exam reveals no gallop and no friction rub.  No murmur heard.  Pulmonary/Chest: Breath sounds normal b/l with no crackles or wheezes audible   Musculoskeletal:   no edema. Grossly normal gait and ambulation with no limitation in activity or range of motion. Mild tenderness to palpation of right hip > left hip around trochanter  Lymphadenopathy: no cervical adenopathy  Neurological: alert and oriented to person, place, and time.   Skin: No cyanosis. Nails show no clubbing.      Note: I have reviewed all pertinent labs and diagnostic tests associated with this visit with specific comments listed under the assessment and plan below    Assessment and Plan    1. Cough in adult  Chronic dry cough with mild subjective SOB with no other upper respiratory symptoms or constitutional symptoms. Complaint of chest congestion and intermittent heartburn. Patient was switched from lisinopril to losartan in 5/2018 due to dry cough.     Losartan discontinued for possible s/e of cough. Chlorthalidone 25mg initiated. Recommended patient use pepcid OTC, nightly, to assess for " GERD contributing to cough.   If these do not improve cough, consider addition of symbicort for adult onset asthma.    2. Essential hypertension  In clinic blood pressure of 142/86. Currently, uncontrolled, but patients medications have been adjusted multiple times. At this visit, losartan discontinued and chlorthalidone 25mg started. Eventual target blood pressure of 130/80. Continue to monitor. Will likely need to increase chlorthalidone dose to 50 mg at future visit.     3. Iliotibial band syndrome, Right leg  Right leg, lateral pain, with possible relation to activity vs posture. No significant limitation in patients mobility or range of motion.     Patient instructed regarding foam roller exercises and tylenol PRN for pain. If it worsens or fails to improve, can consider PT.       Followup: Return if symptoms worsen or fail to improve, for follow up with PCP.      Signed by: Vince Shah M.D.

## 2018-08-10 ENCOUNTER — TELEPHONE (OUTPATIENT)
Dept: INTERNAL MEDICINE | Facility: MEDICAL CENTER | Age: 43
End: 2018-08-10

## 2018-08-10 NOTE — TELEPHONE ENCOUNTER
----- Message from Jolly Ortega sent at 8/10/2018 11:50 AM PDT -----  Regarding: EXTENSIVE COUGH/SEES DR TITUS  Contact: [phone]  PT's , Alfonso came in to office the office today stating that his wife isn't cured from her cough x4-5mo. They have seen multiple providers for this issue and would like a referral to a specialist Possibly Pulmonology.

## 2018-08-16 ENCOUNTER — HOSPITAL ENCOUNTER (OUTPATIENT)
Dept: LAB | Facility: MEDICAL CENTER | Age: 43
End: 2018-08-16
Attending: STUDENT IN AN ORGANIZED HEALTH CARE EDUCATION/TRAINING PROGRAM
Payer: COMMERCIAL

## 2018-08-16 ENCOUNTER — OFFICE VISIT (OUTPATIENT)
Dept: INTERNAL MEDICINE | Facility: MEDICAL CENTER | Age: 43
End: 2018-08-16
Payer: COMMERCIAL

## 2018-08-16 VITALS
SYSTOLIC BLOOD PRESSURE: 119 MMHG | OXYGEN SATURATION: 95 % | BODY MASS INDEX: 28.34 KG/M2 | TEMPERATURE: 97.6 F | DIASTOLIC BLOOD PRESSURE: 80 MMHG | HEIGHT: 58 IN | WEIGHT: 135 LBS | HEART RATE: 78 BPM

## 2018-08-16 DIAGNOSIS — R05.9 COUGH: ICD-10-CM

## 2018-08-16 PROCEDURE — 99214 OFFICE O/P EST MOD 30 MIN: CPT | Mod: GC | Performed by: INTERNAL MEDICINE

## 2018-08-16 PROCEDURE — 36415 COLL VENOUS BLD VENIPUNCTURE: CPT

## 2018-08-16 PROCEDURE — 86480 TB TEST CELL IMMUN MEASURE: CPT

## 2018-08-16 RX ORDER — OMEPRAZOLE 40 MG/1
40 CAPSULE, DELAYED RELEASE ORAL DAILY
Qty: 30 CAP | Refills: 0 | Status: SHIPPED | OUTPATIENT
Start: 2018-08-16 | End: 2018-11-07 | Stop reason: SDUPTHER

## 2018-08-16 RX ORDER — ALBUTEROL SULFATE 90 UG/1
2 AEROSOL, METERED RESPIRATORY (INHALATION) EVERY 6 HOURS PRN
Qty: 8.5 G | Refills: 3 | Status: SHIPPED | OUTPATIENT
Start: 2018-08-16 | End: 2018-10-11

## 2018-08-16 NOTE — PROGRESS NOTES
Established Patient    Ameena presents today with the following:    CC: Cough    HPI: Patient is a 43-year-old female here to follow-up for ongoing complaint of cough. Patient reports that she's had about 6 months of unresolving cough due to several interventions done by other physicians.    Patient reports that her cough is dry and nonproductive in nature. Worse during the day rather than at night. Not associated with congestion, rhinorrhea, or postnasal drip. Patient does not have any fever, chills, or night sweats. Patient denies heartburn.    At previous visits, the patient was found to be on enalapril which was switched to losartan, but the cough did not resolve. At a later visit, the patient was patient with switched from losartan to chlorthalidone, also without resolution of cough. She done at today's visit did not show conclusive cause for the patient's cough.    Of note, the patient's  reports that he has had to be in the past which was successfully treated and that the patient's sister also has TB and is undergoing treatment right now.    Patient Active Problem List    Diagnosis Date Noted   • Allergic rhinitis 04/16/2018   • Overweight (BMI 25.0-29.9) 04/02/2018   • Hypertension 03/22/2018   • Hypothyroidism 03/22/2018   • Hyperlipidemia 03/22/2018   • Hot flashes due to menopause 03/22/2018       Current Outpatient Prescriptions   Medication Sig Dispense Refill   • albuterol (PROVENTIL HFA) 108 (90 Base) MCG/ACT Aero Soln inhalation aerosol Inhale 2 Puffs by mouth every 6 hours as needed for Shortness of Breath. 8.5 g 3   • omeprazole (PRILOSEC) 40 MG delayed-release capsule Take 1 Cap by mouth every day. 30 Cap 0   • chlorthalidone (HYGROTON) 25 MG Tab Take 1 Tab by mouth every day. 30 Tab 3   • levothyroxine (SYNTHROID) 50 MCG Tab Take 1 Tab by mouth Every morning on an empty stomach. 30 Tab 3   • vitamin e (VITAMIN E) 400 UNIT Cap Take 1 Cap by mouth every day. 30 Cap 3   • losartan  "(COZAAR) 25 MG Tab TAKE ONE TABLET BY MOUTH ONE TIME DAILY  30 Tab 3   • loratadine (CLARITIN) 10 MG Tab Take 1 Tab by mouth every day. 60 Tab 2     No current facility-administered medications for this visit.        ROS: As per HPI. Additional pertinent symptoms as noted below.    All other systems reviewed and are negative.    /80   Pulse 78   Temp 36.4 °C (97.6 °F)   Ht 1.473 m (4' 10\")   Wt 61.2 kg (135 lb)   SpO2 95%   BMI 28.22 kg/m²     Physical Exam   Constitutional:  oriented to person, place, and time. No distress.   Eyes: Pupils are equal, round, and reactive to light. No scleral icterus.  Neck: Neck supple. No thyromegaly present.   Cardiovascular: Normal rate, regular rhythm and normal heart sounds.  Exam reveals no gallop and no friction rub.  No murmur heard.  Pulmonary/Chest: Breath sounds normal. No wheezing rales or rhonchi noted bilaterally.  Musculoskeletal:   no edema.   Lymphadenopathy: no cervical adenopathy  Neurological: alert and oriented to person, place, and time.   Skin: No cyanosis. Nails show no clubbing.    Note: I have reviewed all pertinent labs and diagnostic tests associated with this visit with specific comments listed under the assessment and plan below    Assessment and Plan    1. Cough  Patient presents with an ongoing cough for the past 6 months and has underwent several evaluations and treatments without success. Although, there is a suspicion for TB due to the patient's multiple episodes of exposure. Other etiologies include GERD versus adult onset asthma.  - Start omeprazole 40 mg daily  - Referred for formal pulmonary function testing  - Chest x-ray  -QuantiFERON Gold to rule out TB      Followup: Return in about 3 months (around 11/16/2018) for with Dr. Ball.      Signed by: Mariam Briseno M.D.    "

## 2018-08-18 LAB
M TB TUBERC IFN-G BLD QL: NEGATIVE
M TB TUBERC IFN-G/MITOGEN IGNF BLD: 0.04
M TB TUBERC IGNF/MITOGEN IGNF CONTROL: 38.86 [IU]/ML
MITOGEN IGNF BCKGRD COR BLD-ACNC: 0.03 [IU]/ML

## 2018-08-27 ENCOUNTER — HOSPITAL ENCOUNTER (OUTPATIENT)
Dept: RADIOLOGY | Facility: MEDICAL CENTER | Age: 43
End: 2018-08-27
Attending: STUDENT IN AN ORGANIZED HEALTH CARE EDUCATION/TRAINING PROGRAM
Payer: COMMERCIAL

## 2018-08-27 DIAGNOSIS — R05.9 COUGH: ICD-10-CM

## 2018-08-27 PROCEDURE — 71046 X-RAY EXAM CHEST 2 VIEWS: CPT

## 2018-09-11 ENCOUNTER — HOSPITAL ENCOUNTER (OUTPATIENT)
Dept: PULMONOLOGY | Facility: MEDICAL CENTER | Age: 43
End: 2018-09-11
Attending: INTERNAL MEDICINE
Payer: COMMERCIAL

## 2018-09-11 PROCEDURE — 94010 BREATHING CAPACITY TEST: CPT

## 2018-09-11 ASSESSMENT — PULMONARY FUNCTION TESTS
FVC: 1.76
FVC_LLN: 2.37
FEV1/FVC: 86
FEV1: 1.52
FEV1/FVC_PERCENT_LLN: 68
FEV1_LLN: 1.94
FEV1/FVC_PERCENT_PREDICTED: 18
FEV1_PREDICTED: 2.32
FEV1/FVC: 87
FVC_PREDICTED: 2.84
FEV1/FVC_PREDICTED: 82
FEV1_PERCENT_PREDICTED: 65
FVC_PERCENT_PREDICTED: 362
FEV1/FVC_PERCENT_PREDICTED: 105
FEV1/FVC_PERCENT_PREDICTED: 82

## 2018-09-11 NOTE — PROCEDURES
PULMONARY FUNCTION TEST INTERPRETATION    DATE OF PFT:  09/11/2018    DIAGNOSIS:  Cough.    Spirometry:  The patient had good effort and cooperation and understood the   test, her spirometry demonstrates moderate restrictive disease presumed to be   intraparenchymal given by the lung volumes section below, patient had a normal   FEV1/FVC ratio at 87, her MVV was normal as well at 88.  She has mild small   airway disease with FEF 25-75% at 77% predicted.    LUNG VOLUMES:  SVC and IC are both mildly reduced at 63% and 77% respectively   and ERV is reduced at 31% confirming the above findings of restrictive lung   disease.    CONCLUSION:  Mild-to-moderate restrictive lung disease presumed to be   intraparenchymal, DLCO as needed for confirmation, this picture might be   representative of interstitial lung disease.       ____________________________________     MD RODRIGO Gaytan / ERIKA    DD:  09/11/2018 16:09:18  DT:  09/11/2018 16:48:36    D#:  [id number]  Job#:  [id number]

## 2018-09-12 ENCOUNTER — OFFICE VISIT (OUTPATIENT)
Dept: INTERNAL MEDICINE | Facility: MEDICAL CENTER | Age: 43
End: 2018-09-12
Payer: COMMERCIAL

## 2018-09-12 VITALS
SYSTOLIC BLOOD PRESSURE: 122 MMHG | OXYGEN SATURATION: 98 % | HEART RATE: 84 BPM | RESPIRATION RATE: 20 BRPM | WEIGHT: 137.6 LBS | DIASTOLIC BLOOD PRESSURE: 76 MMHG | TEMPERATURE: 98 F | BODY MASS INDEX: 28.88 KG/M2 | HEIGHT: 58 IN

## 2018-09-12 DIAGNOSIS — I10 HYPERTENSION, UNSPECIFIED TYPE: ICD-10-CM

## 2018-09-12 DIAGNOSIS — J84.9 INTERSTITIAL LUNG DISEASE (HCC): ICD-10-CM

## 2018-09-12 DIAGNOSIS — E66.3 OVERWEIGHT (BMI 25.0-29.9): ICD-10-CM

## 2018-09-12 PROCEDURE — 99214 OFFICE O/P EST MOD 30 MIN: CPT | Mod: GC | Performed by: INTERNAL MEDICINE

## 2018-09-12 RX ORDER — BUDESONIDE 0.25 MG/2ML
250 INHALANT ORAL 2 TIMES DAILY
Qty: 60 AMPULE | Refills: 1 | Status: SHIPPED | OUTPATIENT
Start: 2018-09-12 | End: 2018-09-14 | Stop reason: RX

## 2018-09-12 ASSESSMENT — PAIN SCALES - GENERAL: PAINLEVEL: 10=SEVERE PAIN

## 2018-09-12 NOTE — PROCEDURES
PULMONARY FUNCTION TEST INTERPRETATION    DATE OF STUDY:  09/11/2018.    DIAGNOSIS:  Cough.    POSTTEST COMMENTS:  Good patient effort and cooperation:    FINDINGS:  1.  Spirometry and flow volume loops.  2.  Normal FEV1/FVC ratio; however, moderately reduced FVC and FEV1 with no   post-bronchodilator performed, small airway disease demonstrated with reduced   EF 25-75% at 77% predicted.  This picture is reflective of a moderate   restrictive lung disease.    LUNG VOLUMES:  FVC is moderately reduced at 63% predicted with reduced IC at   77% predicted, confirming moderate restrictive disease.    INTERPRETATION:  Moderate intrathoracic restrictive lung disease, lack of DLCO   measurement, precludes diagnosis of DLCO or pulmonary vascular disease.       ____________________________________     MD RODRIGO Gaytan / ERIKA    DD:  09/12/2018 14:43:30  DT:  09/12/2018 15:10:03    D#:  [id number]  Job#:  [id number]

## 2018-09-12 NOTE — PATIENT INSTRUCTIONS
Pulmonary Fibrosis  Pulmonary fibrosis is a type of lung disease that causes scarring. Over time, the scar tissue (fibrosis) builds up in the air sacs of your lungs. This makes it hard for you to breathe. Less oxygen can get into your blood.  Scarring from pulmonary fibrosis gets worse over time. This damage is permanent. Having damaged lungs may make it more likely that you will have heart problems as well.  What are the causes?  Usually, the cause of pulmonary fibrosis is not known (idiopathic pulmonary fibrosis). However, pulmonary fibrosis can be caused by:  · Exposure to occupational and environmental toxins. These include asbestos, silica, and metal dusts.  · Inhaling moldy hay. This can cause an allergic reaction in the lung (farmer's lung) that can lead to pulmonary fibrosis.  · Inhaling toxic fumes.  · Certain medicines. These include drugs used in radiation therapy or used to treat seizures, heart problems, and some infections.  · Autoimmune diseases, such as rheumatoid arthritis, systemic sclerosis, and connective tissue diseases.  · Sarcoidosis. In this disease, areas of inflammatory cells (granulomas) form and most often affect the lungs.  · Genes. Some cases of pulmonary fibrosis may be passed down through families.  What increases the risk?  You may be at a higher risk for developing pulmonary fibrosis if:  · You have a family history of the disease.  · You have an autoimmune disease or another condition linked to pulmonary fibrosis.  · You are exposed to certain substances or fumes found in agricultural, farm, construction, or factory work.  · You take certain medicines.  What are the signs or symptoms?  Symptoms may include:  · Difficulty breathing that gets worse with activity.  · Dry, hacking cough.  · Rapid, shallow breathing during exercise or while at rest.  · Shortness of breath that gets worse (dyspnea).  · Bluish skin and lips.  · Loss of appetite.  · Loss of strength.  · Weight loss and  fatigue.  · Rounded and enlarged fingertips (clubbing).  How is this diagnosed?  Your health care provider may suspect pulmonary fibrosis based on your symptoms and medical history. Diagnosis may include a physical exam. Your health care provider will check for signs that strongly suggest that you have pulmonary fibrosis, such as:  · Blue skin around your fingernails or mouth from reduced oxygen.  · Clubbing around the ends of your fingers.  · A crackling sound when you breathe.  Your health care provider may also do tests to confirm the diagnosis. These may include:  · Looking inside your lungs with an instrument (bronchoscopy).  · Imaging studies of your lungs and heart using:  ¨ X-rays.  ¨ CT scan.  ¨ Sound waves (echocardiogram).  · Tests to measure how well you are breathing (pulmonary function tests).  · Exercise testing to see how well your lungs work while you are walking.  · Blood tests.  · A procedure to remove a small piece of lung tissue to examine in a lab (biopsy).  How is this treated?  There is no cure for pulmonary fibrosis. Treatments focus on managing symptoms and preventing scarring from getting worse. This can include:  · Medicines.  ¨ You may take steroids to prevent permanent lung changes. Your health care provider may put you on a high dose at first, then on lower dosages for the long term.  ¨ Medicines to suppress your body’s defense (immune) system. These can have serious side effects.  · You may be monitored with X-rays and laboratory work.  · Oxygen therapy may be helpful if oxygen in your blood is low.  · Surgery. In some cases, a lung transplant is an option.  Follow these instructions at home:  · Take medicines only as directed by your health care provider.  · Keep your vaccinations up to date as recommended by your health care provider.  · Do not use any tobacco products, including cigarettes, chewing tobacco, or electronic cigarettes. If you need help quitting, ask your health care  provider.  · Get regular exercise, but do not overexert yourself. Ask your health care provider to suggest some activities that are safe for you to do. Walking and chair exercises can help if you have physical limitations.  · Consider joining a pulmonary rehabilitation program or a support group for people with pulmonary fibrosis.  · Eat small meals often so you do not get too full. Overeating can make breathing trouble worse.  · Maintain a healthy weight. Lose weight if you need to.  · Do breathing exercises as directed by your health care provider.  · Keep all follow-up visits as directed by your health care provider. This is important.  Contact a health care provider if:  · You are not able to be as active as usual.  · You have a long-lasting (chronic) cough.  · You are often short of breath.  · You have a fever or chills.  Get help right away if:  · You have chest pain.  · Your breathing is much worse.  · You cannot take a deep breath.  · You have blue skin around your mouth or fingers.  · You have clubbing of your fingers.  · You cough up mucus that is dark in color.  · You have a lot of headaches.  · You get very confused or sleepy.  This information is not intended to replace advice given to you by your health care provider. Make sure you discuss any questions you have with your health care provider.  Document Released: 03/09/2005 Document Revised: 05/25/2017 Document Reviewed: 05/28/2015  [employer] Interactive Patient Education © 2017 [employer]

## 2018-09-13 ENCOUNTER — HOSPITAL ENCOUNTER (OUTPATIENT)
Dept: LAB | Facility: MEDICAL CENTER | Age: 43
End: 2018-09-13
Attending: INTERNAL MEDICINE
Payer: COMMERCIAL

## 2018-09-13 ENCOUNTER — OFFICE VISIT (OUTPATIENT)
Dept: PULMONOLOGY | Facility: HOSPICE | Age: 43
End: 2018-09-13
Payer: COMMERCIAL

## 2018-09-13 VITALS
HEART RATE: 71 BPM | DIASTOLIC BLOOD PRESSURE: 74 MMHG | SYSTOLIC BLOOD PRESSURE: 122 MMHG | RESPIRATION RATE: 16 BRPM | TEMPERATURE: 97.5 F | OXYGEN SATURATION: 97 % | WEIGHT: 137.2 LBS | BODY MASS INDEX: 28.8 KG/M2 | HEIGHT: 58 IN

## 2018-09-13 DIAGNOSIS — R05.9 COUGH: ICD-10-CM

## 2018-09-13 DIAGNOSIS — J84.9 INTERSTITIAL LUNG DISEASE (HCC): ICD-10-CM

## 2018-09-13 DIAGNOSIS — E66.3 OVERWEIGHT (BMI 25.0-29.9): ICD-10-CM

## 2018-09-13 LAB
CK SERPL-CCNC: 65 U/L (ref 0–154)
RHEUMATOID FACT SER IA-ACNC: <10 IU/ML (ref 0–14)

## 2018-09-13 PROCEDURE — 36415 COLL VENOUS BLD VENIPUNCTURE: CPT

## 2018-09-13 PROCEDURE — 82085 ASSAY OF ALDOLASE: CPT

## 2018-09-13 PROCEDURE — 82550 ASSAY OF CK (CPK): CPT

## 2018-09-13 PROCEDURE — 83516 IMMUNOASSAY NONANTIBODY: CPT | Mod: 91

## 2018-09-13 PROCEDURE — 86200 CCP ANTIBODY: CPT

## 2018-09-13 PROCEDURE — 86235 NUCLEAR ANTIGEN ANTIBODY: CPT | Mod: 91

## 2018-09-13 PROCEDURE — 86615 BORDETELLA ANTIBODY: CPT | Mod: 91

## 2018-09-13 PROCEDURE — 99244 OFF/OP CNSLTJ NEW/EST MOD 40: CPT | Performed by: INTERNAL MEDICINE

## 2018-09-13 PROCEDURE — 86431 RHEUMATOID FACTOR QUANT: CPT

## 2018-09-13 PROCEDURE — 82785 ASSAY OF IGE: CPT

## 2018-09-13 RX ORDER — ALBUTEROL SULFATE 90 UG/1
2 AEROSOL, METERED RESPIRATORY (INHALATION) EVERY 6 HOURS PRN
COMMUNITY
End: 2018-10-15 | Stop reason: SDUPTHER

## 2018-09-13 NOTE — PROGRESS NOTES
"Chief Complaint   Patient presents with   • Establish Care     Referred by  for ILD,Cough       HPI: This patient is a 43 y.o. Female who is referred for chronic cough.  She is accompanied by her .  Symptoms started approximately 8 months ago with no specific triggers identified.  Cough is described as \"dry\".  She is a lifelong non-smoker with no past pulmonary history.  She specifically denies childhood asthma, bronchitis or pneumonia.  She has had TB contacts growing up in [facility].  She denies associated dyspnea, GERD, rhinitis, wheezing or chest tightness.  She had been on ACE inhibitor therapy which was discontinued without improvement in symptoms.  QuantiFERON TB Gold is negative.  Chest x-ray is normal.  Spirometry suggests restrictive physiology with reduced vital capacity at 62% predicted.  She has been prescribed Pulmicort and albuterol inhaler, however did not note any significant changes.    Past Medical History:   Diagnosis Date   • Chickenpox    • Hyperlipidemia    • Hypertension    • Thyroid disease        Social History     Social History   • Marital status:      Spouse name: N/A   • Number of children: N/A   • Years of education: N/A     Occupational History   • Not on file.     Social History Main Topics   • Smoking status: Never Smoker   • Smokeless tobacco: Never Used   • Alcohol use No   • Drug use: No   • Sexual activity: Yes     Partners: Male     Other Topics Concern   • Not on file     Social History Narrative   • No narrative on file       Family History   Problem Relation Age of Onset   • Hypertension Mother    • Hyperlipidemia Mother        Current Outpatient Prescriptions on File Prior to Visit   Medication Sig Dispense Refill   • budesonide (PULMICORT) 0.25 MG/2ML Suspension 2 mL by Nebulization route 2 times a day. 60 Ampule 1   • omeprazole (PRILOSEC) 40 MG delayed-release capsule Take 1 Cap by mouth every day. 30 Cap 0   • chlorthalidone (HYGROTON) 25 MG Tab " "Take 1 Tab by mouth every day. 30 Tab 3   • levothyroxine (SYNTHROID) 50 MCG Tab Take 1 Tab by mouth Every morning on an empty stomach. 30 Tab 3   • loratadine (CLARITIN) 10 MG Tab Take 1 Tab by mouth every day. 60 Tab 2   • vitamin e (VITAMIN E) 400 UNIT Cap Take 1 Cap by mouth every day. 30 Cap 3   • albuterol (PROVENTIL HFA) 108 (90 Base) MCG/ACT Aero Soln inhalation aerosol Inhale 2 Puffs by mouth every 6 hours as needed for Shortness of Breath. 8.5 g 3     No current facility-administered medications on file prior to visit.        Allergies: Patient has no known allergies.    ROS:   Constitutional: Denies fevers, chills, night sweats, fatigue or weight loss  Eyes: Denies vision loss, pain, drainage, double vision  Ears, Nose, Throat: Denies earache, difficulty hearing, tinnitus, nasal congestion, hoarseness  Cardiovascular: Denies chest pain, tightness, palpitations, orthopnea or edema  Respiratory: As in HPI  Sleep: Denies daytime sleepiness, snoring, apneas, insomnia, morning headaches  GI: Denies heartburn, dysphagia, nausea, abdominal pain, diarrhea or constipation  : Denies frequent urination, hematuria, discharge or painful urination  Musculoskeletal: Denies back pain, painful joints, sore muscles  Neurological: Denies weakness or headaches  Skin: No rashes    Blood pressure 122/74, pulse 71, temperature 36.4 °C (97.5 °F), resp. rate 16, height 1.473 m (4' 10\"), weight 62.2 kg (137 lb 3.2 oz), SpO2 97 %.    Physical Exam:  Appearance: Well-nourished, well-developed, in no acute distress  HEENT: Normocephalic, atraumatic, white sclera, PERRLA, oropharynx clear  Neck: No adenopathy or masses  Respiratory: no intercostal retractions or accessory muscle use  Lungs auscultation: Clear to auscultation bilaterally  Cardiovascular: Regular rate rhythm. No murmurs, rubs or gallops.  No LE edema  Abdomen: soft, nondistended  Gait: Normal  Digits: No clubbing, cyanosis  Motor: No focal deficits  Orientation: " Oriented to time, person and place    Diagnosis:  1. Cough  AMB PULMONARY FUNCTION TEST/LAB    CT-SINUS LEVEL II W/O IV CONTRAST    B PERTUSSIS IGG/IGM AB QT    IMMUNOGLOBULIN E, TOTAL   2. Overweight (BMI 25.0-29.9)         Plan:  The patient describes chronic cough over the past 8 months, has been treated empirically with inhaled bronchodilators, and discontinued ACE inhibitor therapy, without subjective benefit.  Chest x-ray shows no acute process.  She is a non-smoker.  Spirometry suggests restriction however full lung volume measurements are required to confirm.  Her reduced vital capacity may be from obesity.  Symptoms can be secondary to infection, sinusitis, silent GERD, reactive airways disease, pulmonary parenchymal disease (eg. Bronchiectasis), ILD, or endobronchial lesion.  We will proceed with chest and sinus CAT scan.  Obtain pertussis antibodies and IgE level.  Return in about 4 weeks (around 10/11/2018) for after CT scan, after PFT.

## 2018-09-13 NOTE — PROGRESS NOTES
Established Patient    Ameena presents today with the following:    CC: cough    HPI: Patient is a 43-year-old female here to follow-up for ongoing complaint of cough.     # Cough: Patient reports that she's had about 6 months of unresolving cough due to several interventions done by other physicians.  Patient reports that her cough was dry but lately it is productive in nature. Worse during the day rather than at night. Not associated with congestion, rhinorrhea, or postnasal drip. Patient does not have any fever, chills, or night sweats. Patient denies heartburn.     At previous visits, the patient was found to be on enalapril which was switched to losartan, but the cough did not resolve. At a later visit, the patient was switched from losartan to chlorthalidone, also without resolution of cough. Last visit- work up with CXR, PFT's, screening for TB done which showed- CXR negative, Quantiferon gold negative but PFT's showed consistent with pattern of restrictive lung disease with possibility of interstitial lung disease.Still patient complaints of cough which did not resolve.      # HTN:Diagnosed many years back and was on cardizem.Switched to lisinopril and dose was increased to 20 mg for better control of blood pressure. For the past few months since lisinopril was started she began to have cough and was seen in the office and urgent care for evaluation of cough associated with minimal sputum production. Was given claritin for seasonal allergic rhinitis because she had itchy eyes and sore throat initially.Unable to cough anything up and felt chest pain on the lower side because of excessive coughing. Denies fever, chills, nausea, vomiting, abdominal pain or any sick contacts.Lisinopril was switched to losartan and later to chlorthalidone with work up and results states above from last visit.     Patient Active Problem List    Diagnosis Date Noted   • Allergic rhinitis 04/16/2018   • Overweight (BMI 25.0-29.9)  04/02/2018   • Hypertension 03/22/2018   • Hypothyroidism 03/22/2018   • Hyperlipidemia 03/22/2018   • Hot flashes due to menopause 03/22/2018       Current Outpatient Prescriptions   Medication Sig Dispense Refill   • budesonide (PULMICORT) 0.25 MG/2ML Suspension 2 mL by Nebulization route 2 times a day. 60 Ampule 1   • albuterol (PROVENTIL HFA) 108 (90 Base) MCG/ACT Aero Soln inhalation aerosol Inhale 2 Puffs by mouth every 6 hours as needed for Shortness of Breath. 8.5 g 3   • omeprazole (PRILOSEC) 40 MG delayed-release capsule Take 1 Cap by mouth every day. 30 Cap 0   • chlorthalidone (HYGROTON) 25 MG Tab Take 1 Tab by mouth every day. 30 Tab 3   • levothyroxine (SYNTHROID) 50 MCG Tab Take 1 Tab by mouth Every morning on an empty stomach. 30 Tab 3   • loratadine (CLARITIN) 10 MG Tab Take 1 Tab by mouth every day. 60 Tab 2   • vitamin e (VITAMIN E) 400 UNIT Cap Take 1 Cap by mouth every day. 30 Cap 3     No current facility-administered medications for this visit.        Social History     Social History   • Marital status:      Spouse name: N/A   • Number of children: N/A   • Years of education: N/A     Occupational History   • Not on file.     Social History Main Topics   • Smoking status: Never Smoker   • Smokeless tobacco: Never Used   • Alcohol use No   • Drug use: No   • Sexual activity: Yes     Partners: Male     Other Topics Concern   • Not on file     Social History Narrative   • No narrative on file       Family History   Problem Relation Age of Onset   • Hypertension Mother    • Hyperlipidemia Mother        ROS: As per HPI. Additional pertinent symptoms as noted below.  Constitutional: Denies fever/chills/weight changes.   Eyes: Denies changes/pain in vision  ENT: Denies sore throat/congestion/ear ache.   Cardiovascular: Denies chest pain /palpitations/edema.   Respiratory: Denies SOB/PND/orthopnea. + cough with occasional sputum production  Abdomen: Denies difficulty swallowing/  "diarrhea/constipation/abdominal pain/nausea/vomiting  Genitourinary: Normal urinary habits.   Musculo-skeletal: normal ambulation.Denies muscle or joint pains.  Skin: Denies rash/lesions.  Neurological: Denies weakness/tingling/numbness/headache  Psychological: good mood and cooperative. Denies anxiety /depression       /76   Pulse 84   Temp 36.7 °C (98 °F)   Resp 20   Ht 1.473 m (4' 10\")   Wt 62.4 kg (137 lb 9.6 oz)   SpO2 98%   Breastfeeding? No   BMI 28.76 kg/m²     Physical Exam  General:  Alert and oriented, No apparent distress.    Eyes: Pupils equal and reactive. No scleral icterus.    Throat: Clear no erythema or exudates noted.    Neck: Supple. No lymphadenopathy noted. Thyroid not enlarged.    Lungs: Clear to auscultation and percussion bilaterally.    Cardiovascular: Regular rate and rhythm. No murmurs, rubs or gallops.    Abdomen:  Benign. No rebound or guarding noted.    Extremities: No clubbing, cyanosis, edema.    Skin: Clear. No rash or suspicious skin lesions noted.    Neurological: Oriented to time, place, and person .Cranial nerves intact. No motor/sensory deficits.Reflexes were normal and symmetrical in both upper and lower extremities     Musculoskeletal : NROM of all extremities. No tenderness or deformity noted.     Note: I have reviewed all pertinent labs and diagnostic tests associated with this visit with specific comments listed under the assessment and plan below    Assessment and Plan    1. Interstitial lung disease (HCC)  - Patient presents with an ongoing cough for the past 6 months and has underwent several evaluations and treatments without success. Although, there was a suspicion for TB due to the patient's multiple episodes of exposure- Quantiferon gold test ordered last visit was negative.  - with possibility of GERD- last visit patient was advise to take PPI with out much help  -PFT's ordered last visit showed -   Moderate intrathoracic restrictive lung disease, " lack of DLCO   measurement, precludes diagnosis of DLCO or pulmonary vascular disease.  - Chest x-ray negative  -QuantiFERON Gold to rule out TB negative  - other differentials to rule out systemic causes- ordered work up- ALEXIA, Sjogrens's panel, Connective tissue diseases panel, systemic sclerosis panel, RF, Anti CCP, CK, aldolase.  - ordered high resolution CT  - ordered urgent referral to Pulmonology  - advise to take inhalational steroids -ordered budesonide  As patient insisted some relief with some medication as her cough is worse.- counseled patient that because we do not know exact cause of the chronic cough though PFT's consistent with restrictive pattern- inhalational steroids may or may not work.- patient agreed to plan.  - will follow up next visit    2. Essential hypertension  - today BP is 122/76 and denies any related symptoms.  -Stop lisinopril and switch to losartan and later to current chlorthalidone use with good control  - denies related symptoms and advise to continue.    3. Overweight  - - advised to eat healthy- DASH diet  - Advised to exercise regularly- atleast 30 mins a day for 5 days a week.    Signed by: Mani Ball M.D.

## 2018-09-14 ENCOUNTER — TELEPHONE (OUTPATIENT)
Dept: INTERNAL MEDICINE | Facility: MEDICAL CENTER | Age: 43
End: 2018-09-14

## 2018-09-14 DIAGNOSIS — J98.4 RESTRICTIVE LUNG DISEASE: ICD-10-CM

## 2018-09-15 LAB
ALDOLASE SERPL-CCNC: 2.9 U/L (ref 1.5–8.1)
B PERT IGG SER IA-ACNC: 2.91 IV
B PERT IGM SER IA-ACNC: 0.6 IV
CENTROMERE IGG TITR SER IF: 1 AU/ML (ref 0–40)
ENA JO1 AB TITR SER: 0 AU/ML (ref 0–40)
ENA SM IGG SER-ACNC: 0 AU/ML (ref 0–40)
ENA SS-B IGG SER IA-ACNC: 2 AU/ML (ref 0–40)
IGE SERPL-ACNC: 158 KU/L
RIBOSOMAL P AB SER-ACNC: 3 AU/ML (ref 0–40)
SSA52 R0ENA AB IGG Q0420: 3 AU/ML (ref 0–40)
SSA60 R0ENA AB IGG Q0419: 4 AU/ML (ref 0–40)
U1 SNRNP IGG SER QL: 0 AU/ML (ref 0–40)

## 2018-09-16 LAB — CCP IGG SERPL-ACNC: 4 UNITS (ref 0–19)

## 2018-09-17 LAB
B PERT AB SPEC QL: ABNORMAL
B PERT FHA IGG SER QL: POSITIVE
B PERT IGG SER QL IB: POSITIVE
B PERT IGG SER QL: ABNORMAL
ENA SCL70 IGG SER QL: 0 AU/ML (ref 0–40)
NUCLEAR IGG TITR SER IF: ABNORMAL {TITER}
RNAP III AB SER IA-ACNC: 11 UNITS (ref 0–19)

## 2018-09-27 ENCOUNTER — HOSPITAL ENCOUNTER (OUTPATIENT)
Dept: RADIOLOGY | Facility: MEDICAL CENTER | Age: 43
End: 2018-09-27
Attending: INTERNAL MEDICINE
Payer: COMMERCIAL

## 2018-09-27 DIAGNOSIS — R05.9 COUGH: ICD-10-CM

## 2018-09-27 PROCEDURE — 71250 CT THORAX DX C-: CPT

## 2018-09-27 PROCEDURE — 70486 CT MAXILLOFACIAL W/O DYE: CPT

## 2018-10-11 ENCOUNTER — OFFICE VISIT (OUTPATIENT)
Dept: INTERNAL MEDICINE | Facility: MEDICAL CENTER | Age: 43
End: 2018-10-11
Payer: COMMERCIAL

## 2018-10-11 ENCOUNTER — HOSPITAL ENCOUNTER (OUTPATIENT)
Dept: LAB | Facility: MEDICAL CENTER | Age: 43
End: 2018-10-11
Attending: INTERNAL MEDICINE
Payer: COMMERCIAL

## 2018-10-11 VITALS
WEIGHT: 136 LBS | RESPIRATION RATE: 18 BRPM | HEIGHT: 59 IN | BODY MASS INDEX: 27.42 KG/M2 | OXYGEN SATURATION: 98 % | HEART RATE: 70 BPM | SYSTOLIC BLOOD PRESSURE: 122 MMHG | DIASTOLIC BLOOD PRESSURE: 72 MMHG | TEMPERATURE: 97.8 F

## 2018-10-11 DIAGNOSIS — R91.1 LUNG NODULE: ICD-10-CM

## 2018-10-11 DIAGNOSIS — I10 ESSENTIAL HYPERTENSION: ICD-10-CM

## 2018-10-11 DIAGNOSIS — R76.8 POSITIVE ANA (ANTINUCLEAR ANTIBODY): ICD-10-CM

## 2018-10-11 DIAGNOSIS — E03.9 HYPOTHYROIDISM, UNSPECIFIED TYPE: ICD-10-CM

## 2018-10-11 DIAGNOSIS — R05.3 CHRONIC COUGH: ICD-10-CM

## 2018-10-11 DIAGNOSIS — J32.9 SINUSITIS, UNSPECIFIED CHRONICITY, UNSPECIFIED LOCATION: ICD-10-CM

## 2018-10-11 LAB
ALBUMIN SERPL BCP-MCNC: 4.6 G/DL (ref 3.2–4.9)
ALBUMIN/GLOB SERPL: 1.4 G/DL
ALP SERPL-CCNC: 91 U/L (ref 30–99)
ALT SERPL-CCNC: 23 U/L (ref 2–50)
ANION GAP SERPL CALC-SCNC: 9 MMOL/L (ref 0–11.9)
AST SERPL-CCNC: 19 U/L (ref 12–45)
BILIRUB SERPL-MCNC: 1 MG/DL (ref 0.1–1.5)
BUN SERPL-MCNC: 11 MG/DL (ref 8–22)
CALCIUM SERPL-MCNC: 10.1 MG/DL (ref 8.5–10.5)
CHLORIDE SERPL-SCNC: 99 MMOL/L (ref 96–112)
CO2 SERPL-SCNC: 29 MMOL/L (ref 20–33)
CREAT SERPL-MCNC: 0.71 MG/DL (ref 0.5–1.4)
CRP SERPL HS-MCNC: 0.9 MG/L (ref 0–7.5)
ERYTHROCYTE [SEDIMENTATION RATE] IN BLOOD BY WESTERGREN METHOD: 13 MM/HOUR (ref 0–20)
GLOBULIN SER CALC-MCNC: 3.3 G/DL (ref 1.9–3.5)
GLUCOSE SERPL-MCNC: 87 MG/DL (ref 65–99)
POTASSIUM SERPL-SCNC: 3.6 MMOL/L (ref 3.6–5.5)
PROT SERPL-MCNC: 7.9 G/DL (ref 6–8.2)
SODIUM SERPL-SCNC: 137 MMOL/L (ref 135–145)

## 2018-10-11 PROCEDURE — 80053 COMPREHEN METABOLIC PANEL: CPT

## 2018-10-11 PROCEDURE — 86141 C-REACTIVE PROTEIN HS: CPT

## 2018-10-11 PROCEDURE — 85652 RBC SED RATE AUTOMATED: CPT

## 2018-10-11 PROCEDURE — 99214 OFFICE O/P EST MOD 30 MIN: CPT | Mod: GC | Performed by: INTERNAL MEDICINE

## 2018-10-11 PROCEDURE — 36415 COLL VENOUS BLD VENIPUNCTURE: CPT

## 2018-10-11 RX ORDER — AZITHROMYCIN 250 MG/1
TABLET, FILM COATED ORAL
Qty: 6 TAB | Refills: 0 | Status: SHIPPED | OUTPATIENT
Start: 2018-10-11 | End: 2018-12-07

## 2018-10-11 RX ORDER — BENZONATATE 100 MG/1
100 CAPSULE ORAL 3 TIMES DAILY PRN
Qty: 60 CAP | Refills: 0 | Status: SHIPPED | OUTPATIENT
Start: 2018-10-11 | End: 2018-12-07

## 2018-10-11 ASSESSMENT — PAIN SCALES - GENERAL: PAINLEVEL: NO PAIN

## 2018-10-11 NOTE — PROGRESS NOTES
Established Patient    Ameena presents today with the following:    CC: follow up on cough and HTN    HPI: Patient is a 43-year-old female here to follow-up for ongoing complaint of cough.      # Cough: Patient reports that she's had about 6-8 months of unresolving cough without much help of several interventions done by other physicians.Patient reports that her cough was dry but lately it is productive in nature. Worse during the day rather than at night. Patient does not have any fever, chills, or night sweats. Patient denies heartburn.At previous visits, the patient was on enalapril which was switched to losartan, but the cough did not resolve. At a later visit, the patient was switched from losartan to chlorthalidone, also without resolution of cough. Previous work up with CXR, PFT's, screening for TB done which showed- CXR negative, Quantiferon gold negative but PFT's showed consistent with pattern of restrictive lung disease with possibility of interstitial lung disease.Last visit work up for autoimmune diseases was negative except for positive ALEXIA. Still patient complaints of cough which did not resolve.Referral to pulmonology was ordered and patient followed up with them who ordered workup for pertusis and IgE.      # HTN:Diagnosed many years back and was on cardizem.Switched to lisinopril and dose was increased to 20 mg for better control of blood pressure. She began to have cough and was seen in the office and urgent care for evaluation of cough associated with minimal sputum production. Was given claritin for seasonal allergic rhinitis because she had itchy eyes and sore throat initially.Unable to cough anything up and felt chest pain on the lower side because of excessive coughing. Denies fever, chills, nausea, vomiting, abdominal pain or any sick contacts.Lisinopril was switched to losartan and later to chlorthalidone with work up and results states above and in assessment and plan. BP has been  controlled and stable with current dose of chlorthalidone.      Patient Active Problem List    Diagnosis Date Noted   • Lung nodule 10/11/2018   • Restrictive lung disease 09/14/2018   • Allergic rhinitis 04/16/2018   • Overweight (BMI 25.0-29.9) 04/02/2018   • Hypertension 03/22/2018   • Hypothyroidism 03/22/2018   • Hyperlipidemia 03/22/2018   • Hot flashes due to menopause 03/22/2018       Current Outpatient Prescriptions   Medication Sig Dispense Refill   • levothyroxine (SYNTHROID) 50 MCG Tab take 1 tablet by mouth every morning on an empty stomach 90 Tab 0   • albuterol (PROAIR HFA) 108 (90 Base) MCG/ACT Aero Soln inhalation aerosol Inhale 2 Puffs by mouth every 6 hours as needed for Shortness of Breath.     • albuterol (PROVENTIL HFA) 108 (90 Base) MCG/ACT Aero Soln inhalation aerosol Inhale 2 Puffs by mouth every 6 hours as needed for Shortness of Breath. 8.5 g 3   • omeprazole (PRILOSEC) 40 MG delayed-release capsule Take 1 Cap by mouth every day. 30 Cap 0   • chlorthalidone (HYGROTON) 25 MG Tab Take 1 Tab by mouth every day. 30 Tab 3   • loratadine (CLARITIN) 10 MG Tab Take 1 Tab by mouth every day. 60 Tab 2   • vitamin e (VITAMIN E) 400 UNIT Cap Take 1 Cap by mouth every day. 30 Cap 3   • budesonide (PULMICORT FLEXHALER) 90 MCG/ACT AEROSOL POWDER, BREATH ACTIVATED Inhale 2 Puffs by mouth 2 Times a Day. 1 Each 6     No current facility-administered medications for this visit.        Social History     Social History   • Marital status:      Spouse name: N/A   • Number of children: N/A   • Years of education: N/A     Occupational History   • Not on file.     Social History Main Topics   • Smoking status: Never Smoker   • Smokeless tobacco: Never Used   • Alcohol use No   • Drug use: No   • Sexual activity: Yes     Partners: Male     Other Topics Concern   • Not on file     Social History Narrative   • No narrative on file       Family History   Problem Relation Age of Onset   • Hypertension Mother   "  • Hyperlipidemia Mother        ROS: As per HPI. Additional pertinent symptoms as noted below.    Constitutional: Denies fever/chills/weight changes.   Eyes: Denies changes/pain in vision  ENT: Denies sore throat/congestion/ear ache.   Cardiovascular: Denies chest pain /palpitations/edema.   Respiratory: Denies SOB/PND/orthopnea. + cough with occasional sputum production  Abdomen: Denies difficulty swallowing/ diarrhea/constipation/abdominal pain/nausea/vomiting  Genitourinary: Normal urinary habits.   Musculo-skeletal: normal ambulation.Denies muscle or joint pains.  Skin: Denies rash/lesions.  Neurological: Denies weakness/tingling/numbness/headache  Psychological: good mood and cooperative. Denies anxiety /depression       /72 (BP Location: Left arm, Patient Position: Sitting, BP Cuff Size: Large adult)   Pulse 70   Temp 36.6 °C (97.8 °F) (Temporal)   Resp 18   Ht 1.499 m (4' 11\")   Wt 61.7 kg (136 lb)   SpO2 98%   Breastfeeding? No   BMI 27.47 kg/m²     Physical Exam  General:  Alert and oriented, No apparent distress.    Eyes: Pupils equal and reactive. No scleral icterus.    Throat: Clear no erythema or exudates noted.    Neck: Supple. No lymphadenopathy noted. Thyroid not enlarged.    Lungs: Clear to auscultation and percussion bilaterally.    Cardiovascular: Regular rate and rhythm. No murmurs, rubs or gallops.    Abdomen:  Benign. No rebound or guarding noted.    Extremities: No clubbing, cyanosis, edema.    Skin: Clear. No rash or suspicious skin lesions noted.    Neurological: Oriented to time, place, and person .Cranial nerves intact. No motor/sensory deficits.Reflexes were normal and symmetrical in both upper and lower extremities     Musculoskeletal : NROM of all extremities. No tenderness or deformity noted.     Note: I have reviewed all pertinent labs and diagnostic tests associated with this visit with specific comments listed under the assessment and plan below      Assessment and " Plan    1. Chronic cough  - Patient presents with an ongoing cough for the past 6 months and has underwent several evaluations and treatments without success. Although, there was a suspicion for TB due to the patient's multiple episodes of exposure- Quantiferon gold test ordered last visit was negative.  - with possibility of GERD- in prior visits patient was advise to take PPI with out much help  -PFT's showed -   Moderate intrathoracic restrictive lung disease, lack of DLCO   measurement, precludes diagnosis of DLCO or pulmonary vascular disease.  - Chest x-ray negative  -QuantiFERON Gold to rule out TB negative  - other differentials to rule out systemic causes-  ALEXIA, Sjogrens's panel, Connective tissue diseases panel, systemic sclerosis panel, RF, Anti CCP, CK, aldolase- last visit negative except for positive ALEXIA.  - high resolution CT showed ---No evidence for interstitial lung disease. 3 subpleural nodule in the right middle lobe measuring 5 mm, 4 mm, 3 mm. Right hepatic lobe calcification which is most likely postinflammatory. Probable hepatomegaly  - last visit ordered referral to Pulmonology and patient following with them- work up for pertussis and Ig E showed possible Pertusis  So ordered empiric antibiotics- Azithromycin 500 mg day 1 and 250 mg from day 2-4.   - ordered ESR,CRP for next visit  - ordered tessalon for symptomatic relief of cough  - will follow up next visit     2. Essential hypertension  - today BP is 122/72 and denies any related symptoms.  - currently on chlorthalidone with good control  - ordered CMP for next visit     3. Hypothyroidism, unspecified type  Last thyroid panel showed     Ref. Range 5/18/2018 13:40   TSH Latest Ref Range: 0.380 - 5.330 uIU/mL 0.320 (L)   Free T-4 Latest Ref Range: 0.53 - 1.43 ng/dL 1.02   Currently on 50 mcg of levothyroxine 1 tab PO once daily  Ordered repeat level for next visit    4. Lung nodules  - recently noted on CT of chest-3 subpleural nodule in  the right middle lobe measuring 5 mm, 4 mm, 3 mm  - except cough denies any other complaints  - will continue to follow up      Signed by: Mani Ball M.D.

## 2018-10-11 NOTE — PATIENT INSTRUCTIONS
Pertussis, Adult  Pertussis, also called whooping cough, is an infection that causes severe and sudden coughing attacks. Pertussis spreads easily from person to person (is contagious). It spreads through the droplets that are sprayed in the air when an infected person talks, coughs, or sneezes. Symptoms of pertussis can last for up to 6 weeks, even though the cough starts to get better. It may take as long as 6 months for the cough to go away completely.  What are the causes?  This condition is caused by bacteria. The bacteria can spread to someone when he or she:  · Inhales droplets that have been sprayed in the air by an infected person.  · Touches a surface where the droplets fell and then touches his or her mouth or nose.  What are the signs or symptoms?  Symptoms of this condition include cold-like symptoms, such as:  · A runny nose.  · Low fever.  · Mild cough.  · Red, watery eyes.  These symptoms develop at the beginning of the infection. After 1-2 weeks, the cold symptoms get better, but the cough worsens, and severe and sudden coughing attacks frequently develop. During these attacks, people may cough so hard that vomiting occurs.  How is this diagnosed?  This condition may be diagnosed by:  · A physical exam.  · Lab tests of mucus from the nose and throat.  · A blood test.  · A chest X-ray.  How is this treated?  This condition is treated with antibiotic medicines.  · Starting antibiotics quickly may help shorten the illness and make it less contagious.  · Antibiotics may also be prescribed for everyone who lives in the same household.  · Immunization may be recommended for people in the household who are at risk of developing pertussis. At-risk groups include:  ¨ Infants.  ¨ Those who have not had their full course of pertussis immunizations.  ¨ Those who were immunized but have not had their recent booster shot.  Mild coughing may continue for months after the infection is treated. This coughing may be  due to the remaining soreness and inflammation in the lungs.  Follow these instructions at home:  Medicines  · Take over-the-counter and prescription medicines only as told by your health care provider.  · Take your antibiotic medicine as told by your health care provider. Do not stop taking the antibiotic even if you start to feel better.  · Do not take cough medicine unless told by your health care provider.  Coughing attack  · If you are having a coughing attack:  ¨ Raise (elevate) the head of your mattress or raise your head with pillows to improve breathing. This will also make it easier to clear out mucus that is brought up from the lungs to the throat during a cough (sputum).  ¨ Sit upright.  · Avoid substances that may irritate the lungs, such as smoke, aerosols, or fumes. These substances may make your coughing worse.  · Use a cool mist humidifier at home to increase air moisture. This will soothe your cough and help to loosen sputum. Do not use hot steam.  Prevent infection  · For the first 5 days of antibiotic treatment, stay away from those who are at risk of developing pertussis. If no antibiotics are prescribed, stay at home for the first 3 weeks that you are coughing or as told by your health care provider.  · Do not go to work until you have been treated with antibiotics for 5 days. If no antibiotics are prescribed, do not go to work for the first 3 weeks that you are coughing or as told by your health care provider. Tell your workplace that you were diagnosed with pertussis.  · Wash your hands often with soap and water. Everyone in your household should also wash hands often to avoid spreading the infection. If soap and water are not available, use hand .  General instructions  · Rest as much as possible. Slowly go back to your normal activities as told by your health care provider.  · Drink enough fluid to keep your urine clear or pale yellow.  · Keep all follow-up visits as told by your  health care provider. This is important.  How is this prevented?  · Pertussis can be prevented with a vaccine and later booster shots.  · The pertussis vaccine is given during childhood.  · If you are an adult who was never vaccinated, get vaccinated as soon as possible.  · If you are an adult who was previously vaccinated, talk with your health care providers about the need for a booster shot because immunity from the vaccine decreases over time.  · All of the following people should consider receiving a booster dose of pertussis:  ¨ Pregnant women.  ¨ Everyone who has or will have close contact with an infant who is less than 12 months old.  ¨ All health care personnel.  Contact a health care provider if:  · You cannot stop vomiting.  · You are not able to eat or drink.  · Your cough does not improve.  · You have a fever.  Get help right away if:  · Your face turns red or blue during a coughing attack.  · You pass out after a coughing attack, even if only for a few moments.  · Your breathing stops for a period of time (apnea).  · You are restless or cannot sleep.  · You feel sluggish or you are sleeping too much.  This information is not intended to replace advice given to you by your health care provider. Make sure you discuss any questions you have with your health care provider.  Document Released: 04/14/2014 Document Revised: 07/08/2017 Document Reviewed: 06/05/2017  [employer] Interactive Patient Education © 2017 [employer]

## 2018-10-15 ENCOUNTER — OFFICE VISIT (OUTPATIENT)
Dept: PULMONOLOGY | Facility: HOSPICE | Age: 43
End: 2018-10-15
Payer: COMMERCIAL

## 2018-10-15 ENCOUNTER — NON-PROVIDER VISIT (OUTPATIENT)
Dept: PULMONOLOGY | Facility: HOSPICE | Age: 43
End: 2018-10-15
Payer: COMMERCIAL

## 2018-10-15 VITALS
HEART RATE: 87 BPM | SYSTOLIC BLOOD PRESSURE: 106 MMHG | WEIGHT: 135 LBS | TEMPERATURE: 98.1 F | BODY MASS INDEX: 28.34 KG/M2 | RESPIRATION RATE: 16 BRPM | HEIGHT: 58 IN | DIASTOLIC BLOOD PRESSURE: 70 MMHG | OXYGEN SATURATION: 98 %

## 2018-10-15 VITALS — WEIGHT: 135 LBS | HEIGHT: 58 IN | BODY MASS INDEX: 28.34 KG/M2

## 2018-10-15 DIAGNOSIS — R05.9 COUGH: ICD-10-CM

## 2018-10-15 DIAGNOSIS — Z00.00 HEALTH CARE MAINTENANCE: ICD-10-CM

## 2018-10-15 DIAGNOSIS — R05.3 CHRONIC COUGH: ICD-10-CM

## 2018-10-15 DIAGNOSIS — R91.8 PULMONARY NODULES: ICD-10-CM

## 2018-10-15 PROCEDURE — 90471 IMMUNIZATION ADMIN: CPT | Performed by: PHYSICIAN ASSISTANT

## 2018-10-15 PROCEDURE — 94060 EVALUATION OF WHEEZING: CPT | Performed by: INTERNAL MEDICINE

## 2018-10-15 PROCEDURE — 90686 IIV4 VACC NO PRSV 0.5 ML IM: CPT | Performed by: PHYSICIAN ASSISTANT

## 2018-10-15 PROCEDURE — 94729 DIFFUSING CAPACITY: CPT | Performed by: INTERNAL MEDICINE

## 2018-10-15 PROCEDURE — 99214 OFFICE O/P EST MOD 30 MIN: CPT | Mod: 25 | Performed by: PHYSICIAN ASSISTANT

## 2018-10-15 PROCEDURE — 94726 PLETHYSMOGRAPHY LUNG VOLUMES: CPT | Performed by: INTERNAL MEDICINE

## 2018-10-15 RX ORDER — ALBUTEROL SULFATE 90 UG/1
2 AEROSOL, METERED RESPIRATORY (INHALATION) EVERY 6 HOURS PRN
Qty: 8.5 G | Refills: 3 | Status: SHIPPED | OUTPATIENT
Start: 2018-10-15 | End: 2018-11-07 | Stop reason: SDUPTHER

## 2018-10-15 ASSESSMENT — PULMONARY FUNCTION TESTS
FEV1/FVC: 86
FEV1_PERCENT_CHANGE: 0
FEV1_PERCENT_PREDICTED: 66
FVC: 1.84
FVC_PERCENT_PREDICTED: 61
FVC_PERCENT_PREDICTED: 62
FEV1: 1.56
FVC_PREDICTED: 2.94
FEV1/FVC_PERCENT_PREDICTED: 81
FEV1_LLN: 1.99
FEV1/FVC_PERCENT_CHANGE: -1
FVC: 1.81
FEV1/FVC_PERCENT_CHANGE: 0
FEV1_PREDICTED: 2.38
FEV1/FVC: 85.33
FEV1/FVC: 86
FEV1_PERCENT_PREDICTED: 65
FEV1: 1.57
FEV1/FVC_PREDICTED: 81
FEV1/FVC_PERCENT_PREDICTED: 105
FEV1_PERCENT_CHANGE: 1
FEV1/FVC_PERCENT_PREDICTED: 107
FVC_LLN: 2.45
FEV1/FVC_PERCENT_PREDICTED: 106
FEV1/FVC: 85
FEV1/FVC_PERCENT_LLN: 68
FEV1/FVC_PERCENT_PREDICTED: 106

## 2018-10-15 NOTE — PROGRESS NOTES
CC    HPI:  Ameena Hines is a 43 y.o. year old female here today for follow-up on chronic cough with repeat pulmonary function test, chest CT pertussis and IgE lab draws.  Both patient and  speak English as a second language, some difficulty negotiating history and symptomology but stated understanding of instructions.  PFT today as compared to August showed an FVC of 1.81 L or 61% predicted as compared to 63%, FEV1 of 1.56 L or 65% predicted as compared to 61%, FEV1/FVC % was 106% compared to 96%.  DLCO was 129% predicted .Chest x-ray 8/27 per radiology report showed no acute cardiopulmonary abnormality.   Chest CT on 9/27 no evidence for interstitial lung disease, 3 subpleural nodules in the right middle lobe measuring 3 mm, 4 mm, 5 mm.  Patient is a never smoker, no family history of lung cancer.  Follow up chest CT in 6 months.  CT maxillofacial per radiology report showed mucosal thickening within multiple ethmoid air cells, small amount of fluid and mucosal thickening within both sphenoid sinuses, minimal right mastoid sinus mucosal thickening and nasal septum deviated to the right.   Chest x-ray 8/27 per radiology report showed no acute cardiopulmonary abnormality.  Pertussis was negative, IgE 158 on 9/13. As stated in last office visit QuantiFERON TB gold was negative.  When reviewing history and medications patient denied ever being on Pulmicort neither nebulizer or inhaler and states she has required her albuterol inhaler 1-2 times a day every day.  She has been using salt water gargles to help expectorate secretions which have been clear.  Retrial Pulmicort daily with purpose explained to patient, continue albuterol as needed, reviewed indications and timing at least 4 hours apart and use of spacer for both inhalers.  Follow-up appointment in 3 months reassess for benefit.  Requested flu shot in office today.    ROS:     Constitutional: Patient denied fever, chills, unintentional weight loss,  "fatigue  Eyes: Denies vision changes, blurred vision pressure below or behind eyes  ENT: Denies earaches, sinus infections, nasal congestion or runny nose, sore throat, hoarseness, patient does report a mildly sore tongue  GI: No longer taking PPI, denies heartburn, difficulty swallowing  Respiratory: Cough with clear secretions, cough decreases with expectoration, denies wheezing but does describe difficulty getting a deep breath improved with albuterol MDI, short of breath with extended activity or when feeling anxious  CV: Denies murmurs chest pain, pressure, palpitations, edema, orthopnea      Past Medical History:   Diagnosis Date   • Chickenpox    • Hyperlipidemia    • Hypertension    • Thyroid disease        Past Surgical History:   Procedure Laterality Date   • LAPAROSCOPY         Family History   Problem Relation Age of Onset   • Hypertension Mother    • Hyperlipidemia Mother        Social History     Social History   • Marital status:      Spouse name: N/A   • Number of children: N/A   • Years of education: N/A     Occupational History   • Not on file.     Social History Main Topics   • Smoking status: Never Smoker   • Smokeless tobacco: Never Used   • Alcohol use No   • Drug use: No   • Sexual activity: Yes     Partners: Male     Other Topics Concern   • Not on file     Social History Narrative   • No narrative on file       Allergies as of 10/15/2018   • (No Known Allergies)        Vital signs for this encounter:  Vitals:    10/15/18 0909   Height: 1.473 m (4' 10\")   Weight: 61.2 kg (135 lb)   Weight % change since last entry.: 0 %   BP: 106/70   Pulse: 87   BMI (Calculated): 28.22   Resp: 16   Temp: 36.7 °C (98.1 °F)       Current medications as of today   Current Outpatient Prescriptions   Medication Sig Dispense Refill   • budesonide (PULMICORT FLEXHALER) 90 MCG/ACT AEROSOL POWDER, BREATH ACTIVATED Inhale 1 Puff by mouth 2 Times a Day. Rinse mouth after each use. 1 Each 3   • albuterol (PROAIR " HFA) 108 (90 Base) MCG/ACT Aero Soln inhalation aerosol Inhale 2 Puffs by mouth every 6 hours as needed for Shortness of Breath. 8.5 g 3   • azithromycin (ZITHROMAX) 250 MG Tab Take 2 tabs on day 1, take 1 tab PO for next 4 days. 6 Tab 0   • benzonatate (TESSALON) 100 MG Cap Take 1 Cap by mouth 3 times a day as needed for Cough. 60 Cap 0   • levothyroxine (SYNTHROID) 50 MCG Tab take 1 tablet by mouth every morning on an empty stomach 90 Tab 0   • budesonide (PULMICORT FLEXHALER) 90 MCG/ACT AEROSOL POWDER, BREATH ACTIVATED Inhale 2 Puffs by mouth 2 Times a Day. 1 Each 6   • omeprazole (PRILOSEC) 40 MG delayed-release capsule Take 1 Cap by mouth every day. 30 Cap 0   • chlorthalidone (HYGROTON) 25 MG Tab Take 1 Tab by mouth every day. 30 Tab 3   • loratadine (CLARITIN) 10 MG Tab Take 1 Tab by mouth every day. 60 Tab 2     No current facility-administered medications for this visit.          Physical Exam:   Gen:           Alert and oriented, No apparent distress. Mood and affect appropriate, normal interaction    Eyes:          sclere white, conjunctive moist.  Hearing:     Grossly intact.  Dentition:    Good dentition.  Oropharynx:   Tongue appears dry, posterior pharynx without erythema or exudate.  Neck:        Supple, trachea midline, no masses.  Respiratory Effort: No intercostal retractions or use of accessory muscles.   Lung Auscultation:      Scattered end expiratory wheezes, good aeration  CV:            Regular rate and rhythm. No murmurs, rubs or gallops appreciated.  No edema  Digits, Nails, Ext: No clubbing, cyanosis, petechiae, or nodes.   Skin:        No rashes, lesions or ulcers noted on back or exposed skin surfaces.                     Assessment:  1. Health care maintenance  Influenza Vaccine Quad Injection >3Y (PF)   2. Pulmonary nodules  CT-CHEST (THORAX) W/O       Immunizations:    Flu: Given today  Pneumovax 23: Not due  Prevnar 13: Not due    Plan:    1-Albuterol inhaler (red one) when coughing  or heavy breathing  2-Pulmicort inhaler every day  3- CT chest in 6 months  4-see me in 3 months      This dictation was created using voice recognition software. The accuracy of the dictation is limited to the abilities of the software. I expect there may be some errors of grammar and possibly content.

## 2018-10-15 NOTE — PATIENT INSTRUCTIONS
1-Albuterol inhaler (red one) when coughing or heavy breathing  2-Pulmicort inhaler every day  3- CT chest in 6 months  4-see me in 3 months

## 2018-10-15 NOTE — PROCEDURES
Technician: ERICKSON Demarco    Technician Comment:  Good patient effort & cooperation.  The results of this test meet the ATS/ERS standards for acceptability & reproducibility.  Test was performed on the [employer] Combustion Body Plethysmograph-Elite DX system.  Predicted values were [facility]-3 for spirometry, [facility] for DLCO, ITS for Lung Volumes.  The DLCO was uncorrected for Hgb.  A bronchodilator of Ventolin HFA -2puffs via spacer administered.  DLCO performed during dilation period.    The FVC is 1.84 L or 62%, FEV1 is 1.57 L or 66%, FEV1/FVC 85%.  TLC of 95%.  DLCO 129%.  No significant bronchodilator response.      Interpretation:  Spirometry showed pseudo-restriction secondary to BMI: 28.  Normal total lung capacity and diffusion capacity.

## 2018-11-07 ENCOUNTER — OFFICE VISIT (OUTPATIENT)
Dept: PULMONOLOGY | Facility: HOSPICE | Age: 43
End: 2018-11-07
Payer: COMMERCIAL

## 2018-11-07 VITALS
SYSTOLIC BLOOD PRESSURE: 136 MMHG | BODY MASS INDEX: 27.21 KG/M2 | OXYGEN SATURATION: 98 % | HEART RATE: 82 BPM | TEMPERATURE: 98.2 F | RESPIRATION RATE: 16 BRPM | DIASTOLIC BLOOD PRESSURE: 80 MMHG | HEIGHT: 59 IN | WEIGHT: 135 LBS

## 2018-11-07 DIAGNOSIS — J98.4 RESTRICTIVE LUNG DISEASE: ICD-10-CM

## 2018-11-07 DIAGNOSIS — E66.3 OVERWEIGHT (BMI 25.0-29.9): ICD-10-CM

## 2018-11-07 DIAGNOSIS — K21.9 GASTROESOPHAGEAL REFLUX DISEASE, ESOPHAGITIS PRESENCE NOT SPECIFIED: ICD-10-CM

## 2018-11-07 DIAGNOSIS — R91.1 LUNG NODULE: ICD-10-CM

## 2018-11-07 DIAGNOSIS — R05.9 COUGH: ICD-10-CM

## 2018-11-07 DIAGNOSIS — J30.2 SEASONAL ALLERGIC RHINITIS, UNSPECIFIED TRIGGER: ICD-10-CM

## 2018-11-07 DIAGNOSIS — Z78.9 LANGUAGE BARRIER TO COMMUNICATION: ICD-10-CM

## 2018-11-07 PROCEDURE — 99214 OFFICE O/P EST MOD 30 MIN: CPT | Performed by: PHYSICIAN ASSISTANT

## 2018-11-07 RX ORDER — ALBUTEROL SULFATE 90 UG/1
2 AEROSOL, METERED RESPIRATORY (INHALATION) EVERY 6 HOURS PRN
Qty: 8.5 G | Refills: 3 | Status: SHIPPED | OUTPATIENT
Start: 2018-11-07 | End: 2019-02-21 | Stop reason: SDUPTHER

## 2018-11-07 RX ORDER — OMEPRAZOLE 40 MG/1
40 CAPSULE, DELAYED RELEASE ORAL DAILY
Qty: 30 CAP | Refills: 2 | Status: SHIPPED | OUTPATIENT
Start: 2018-11-07 | End: 2019-02-04 | Stop reason: SDUPTHER

## 2018-11-07 NOTE — PATIENT INSTRUCTIONS
1-daily omeprazole, reflux precautions  2-methylcholine challenge test  3-follow up appointment to review results  4-follow-up CT chest 6 months

## 2018-11-08 NOTE — PROGRESS NOTES
CC    HPI:  Ameena Hines is a 43 y.o. year old female here today for follow-up on persistent cough times 7-8 months.  Although both patient and her  speak English as a second language, at last appointment had some difficulty negotiating history and symptomology.  Language interpretive services for [facility] utilized.  In review of medication and apparent lack of understanding or compliance factory in 2 assessment of benefit.  Patient previously ordered on Claritin reports trial times 2 weeks with no benefit.  Tessalon ordered previously without consistent use.  Omeprazole also ordered previously without consistent use as per my understanding.  Patient does report twice daily usage of Pulmicort and albuterol inhalers without significant impact in her symptomology of productive cough.  Was placed on antibiotics with reported 2 or 3-day benefit with resumption of increased cough.    Previous PFT on 10/15/2018 reviewed with an FVC of 1.84 L or 62%, FEV1 of 1.57 L or 66%, FEV1 FVC ratio of 85%, total lung capacity of 95%, DLCO of 129% predicted.  No significant bronchodilator response.  Per Dr. tripp interpretation spirometry shows pseudo-restriction secondary to BMI of 28, normal total lung capacity and diffusion capacity.  Imaging reviewed, patient did have a high resolution CT of the chest which demonstrated per radiology report no evidence for interstitial lung disease, 3 subpleural nodules in the right middle lobe measuring 3 mm, 4 mm, 5 mm.  Patient did have a CT maxillofacial/paranasal sinuses without contrast plus reconstructions.  Per radiology report nasal septum deviated to the right, mucosal thickening within multiple ethmoid air cells, small amount of fluid and mucosal thickening within both sphenoid sinuses, minimal right mastoid sinus mucosal thickening.  Did have a chest x-ray 2 view on 8/27/2018 with no acute cardiopulmonary process seen.     Did review patient case with Dr. Rose with plan to  continue daily Pulmicort and albuterol inhaler use, strict GERD precautions including sleeping with head of bed elevated and recommended dietary practices, methacholine challenge and follow-up appointment.  Plan of care reviewed with patient and  via .  Patient does exhibit exhibit reluctance to take any medication long-term and did state she feels cough would resolve with the right medication  But willing to pursue current plan.  Did review vitals including blood pressure of 136/80 and BMI of 27.27.    ROS:     Constitutional: Occasional fever and/or chills, reports palms will become very warm, denies night sweats, increased fatigue, unintentional weight loss  Skin: Reports no rashes, hair or nail changes, lumps, sores, does report some scalp moles   Eyes: Does have glasses which she does not often wear for driving or watching TV despite having some blurry vision, no acute changes  ENT: No dentures, decreased sense of smell, denies allergies but describes allergy-like reaction to dust when cleaning with some sneezing and increase of cough, sore throat with increased coughing and intermittent ear congestion, denies sensation of postnasal drip  GI: Reports sour taste after some meals or coughing, intermittent difficulty swallowing as patient reports food or phlegm feels like it gets stuck at times  Respiratory: Frequent cough with copious thick, sticky clear secretions, difficult at times to expectorate easier with pressure on her back, reports slight wheezing at times after coughing, reports chest congestion that feels lighter after expectoration, reports shortness of breath with any extended walking, denies TB, denies occupational exposure  CV: Denies history of murmurs, chest pain, does report left-sided pressure radiating to back with extended coughing which self resolves, reports some racing heart rate and shakiness after extended coughing, denies edema, orthopnea    Past Medical  "History:   Diagnosis Date   • Chickenpox    • Hyperlipidemia    • Hypertension    • Thyroid disease        Past Surgical History:   Procedure Laterality Date   • LAPAROSCOPY         Family History   Problem Relation Age of Onset   • Hypertension Mother    • Hyperlipidemia Mother        Social History     Social History   • Marital status:      Spouse name: N/A   • Number of children: N/A   • Years of education: N/A     Occupational History   • Not on file.     Social History Main Topics   • Smoking status: Never Smoker   • Smokeless tobacco: Never Used   • Alcohol use No   • Drug use: No   • Sexual activity: Yes     Partners: Male     Other Topics Concern   • Not on file     Social History Narrative   • No narrative on file       Allergies as of 11/07/2018   • (No Known Allergies)        @Vital signs for this encounter:  Vitals:    11/07/18 1346 11/07/18 1353   Height: 1.499 m (4' 11\")    Weight: 61.2 kg (135 lb)    Weight % change since last entry.: 0 %    BP: 136/80    Pulse: 82    BMI (Calculated): 27.27    Resp: 16    Temp: 36.8 °C (98.2 °F)    TempSrc: Oral    O2 sat % room air:  98 %       Current medications as of today   Current Outpatient Prescriptions   Medication Sig Dispense Refill   • omeprazole (PRILOSEC) 40 MG delayed-release capsule Take 1 Cap by mouth every day. 30 Cap 2   • budesonide (PULMICORT FLEXHALER) 90 MCG/ACT AEROSOL POWDER, BREATH ACTIVATED Inhale 2 Puffs by mouth 2 Times a Day. 1 Each 6   • albuterol (PROAIR HFA) 108 (90 Base) MCG/ACT Aero Soln inhalation aerosol Inhale 2 Puffs by mouth every 6 hours as needed for Shortness of Breath. 8.5 g 3   • budesonide (PULMICORT FLEXHALER) 90 MCG/ACT AEROSOL POWDER, BREATH ACTIVATED Inhale 1 Puff by mouth 2 Times a Day. Rinse mouth after each use. 1 Each 3   • azithromycin (ZITHROMAX) 250 MG Tab Take 2 tabs on day 1, take 1 tab PO for next 4 days. 6 Tab 0   • benzonatate (TESSALON) 100 MG Cap Take 1 Cap by mouth 3 times a day as needed for " Cough. 60 Cap 0   • levothyroxine (SYNTHROID) 50 MCG Tab take 1 tablet by mouth every morning on an empty stomach 90 Tab 0   • chlorthalidone (HYGROTON) 25 MG Tab Take 1 Tab by mouth every day. 30 Tab 3   • loratadine (CLARITIN) 10 MG Tab Take 1 Tab by mouth every day. 60 Tab 2     No current facility-administered medications for this visit.          Physical Exam:   Gen:           Alert and oriented, No apparent distress. Mood and affect appropriate  Eyes:          sclere white, conjunctive moist.  Hearing:     Grossly intact.  Dentition:    Good dentition.  Oropharynx:   Tongue normal, posterior pharynx with mild erythema without exudate.  Mallampati Classification: I  Neck:        Supple, trachea midline, no masses.  Respiratory Effort: No intercostal retractions or use of accessory muscles.   Lung Auscultation:      Few fine expiratory wheezes with forced expiration otherwise clear, no rales, rhonchi or wheezing.  CV:            Regular rate and rhythm. No murmurs, rubs or gallops appreciated, no edema  Digits, Nails, Ext: No clubbing, cyanosis, petechiae, or nodes.   Skin:        No rashes, lesions or ulcers noted on back or exposed skin surfaces        Assessment:  1. Cough  Bronchial Challenge with Methacholine    omeprazole (PRILOSEC) 40 MG delayed-release capsule   2. Language barrier to communication     3. Restrictive lung disease  budesonide (PULMICORT FLEXHALER) 90 MCG/ACT AEROSOL POWDER, BREATH ACTIVATED   4. Seasonal allergic rhinitis, unspecified trigger     5. Gastroesophageal reflux disease, esophagitis presence not specified     6. Lung nodule     7. Overweight (BMI 25.0-29.9)         Immunizations:    Flu: Given 10/15/2018  Pneumovax 23: Not indicated  Prevnar 13: Not indicated    Plan:  1-daily omeprazole, strict reflux precautions  2-methylcholine challenge test  3-follow up appointment same day  4-follow-up CT chest 6 months    This dictation was created using voice recognition software. The  accuracy of the dictation is limited to the abilities of the software. I expect there may be some errors of grammar and possibly content.

## 2018-11-24 PROCEDURE — 99284 EMERGENCY DEPT VISIT MOD MDM: CPT

## 2018-11-25 ENCOUNTER — HOSPITAL ENCOUNTER (EMERGENCY)
Facility: MEDICAL CENTER | Age: 43
End: 2018-11-25
Attending: EMERGENCY MEDICINE
Payer: COMMERCIAL

## 2018-11-25 VITALS
HEART RATE: 90 BPM | SYSTOLIC BLOOD PRESSURE: 125 MMHG | BODY MASS INDEX: 29.78 KG/M2 | TEMPERATURE: 98.1 F | OXYGEN SATURATION: 95 % | DIASTOLIC BLOOD PRESSURE: 72 MMHG | RESPIRATION RATE: 16 BRPM | WEIGHT: 147.71 LBS | HEIGHT: 59 IN

## 2018-11-25 DIAGNOSIS — N30.01 ACUTE CYSTITIS WITH HEMATURIA: ICD-10-CM

## 2018-11-25 DIAGNOSIS — N39.0 ACUTE UTI: ICD-10-CM

## 2018-11-25 LAB
APPEARANCE UR: ABNORMAL
BACTERIA #/AREA URNS HPF: ABNORMAL /HPF
BILIRUB UR QL STRIP.AUTO: NEGATIVE
COLOR UR: ABNORMAL
EPI CELLS #/AREA URNS HPF: ABNORMAL /HPF
GLUCOSE UR STRIP.AUTO-MCNC: NEGATIVE MG/DL
HCG UR QL: NEGATIVE
KETONES UR STRIP.AUTO-MCNC: ABNORMAL MG/DL
LEUKOCYTE ESTERASE UR QL STRIP.AUTO: ABNORMAL
MICRO URNS: ABNORMAL
NITRITE UR QL STRIP.AUTO: POSITIVE
PH UR STRIP.AUTO: 7.5 [PH]
PROT UR QL STRIP: 100 MG/DL
RBC # URNS HPF: >150 /HPF
RBC UR QL AUTO: ABNORMAL
SP GR UR REFRACTOMETRY: 1.02
SP GR UR STRIP.AUTO: 1.02
UROBILINOGEN UR STRIP.AUTO-MCNC: 1 MG/DL
WBC #/AREA URNS HPF: ABNORMAL /HPF

## 2018-11-25 PROCEDURE — 87186 SC STD MICRODIL/AGAR DIL: CPT

## 2018-11-25 PROCEDURE — A9270 NON-COVERED ITEM OR SERVICE: HCPCS | Performed by: EMERGENCY MEDICINE

## 2018-11-25 PROCEDURE — 700102 HCHG RX REV CODE 250 W/ 637 OVERRIDE(OP): Performed by: EMERGENCY MEDICINE

## 2018-11-25 PROCEDURE — 700111 HCHG RX REV CODE 636 W/ 250 OVERRIDE (IP): Performed by: EMERGENCY MEDICINE

## 2018-11-25 PROCEDURE — 87077 CULTURE AEROBIC IDENTIFY: CPT

## 2018-11-25 PROCEDURE — 81001 URINALYSIS AUTO W/SCOPE: CPT

## 2018-11-25 PROCEDURE — 87086 URINE CULTURE/COLONY COUNT: CPT

## 2018-11-25 PROCEDURE — 81025 URINE PREGNANCY TEST: CPT

## 2018-11-25 RX ORDER — NAPROXEN 500 MG/1
500 TABLET ORAL ONCE
Status: COMPLETED | OUTPATIENT
Start: 2018-11-25 | End: 2018-11-25

## 2018-11-25 RX ORDER — CEFDINIR 300 MG/1
300 CAPSULE ORAL 2 TIMES DAILY
Qty: 14 CAP | Refills: 0 | Status: SHIPPED | OUTPATIENT
Start: 2018-11-25 | End: 2018-12-02

## 2018-11-25 RX ORDER — ONDANSETRON 4 MG/1
4 TABLET, ORALLY DISINTEGRATING ORAL EVERY 8 HOURS PRN
Qty: 9 TAB | Refills: 0 | Status: SHIPPED | OUTPATIENT
Start: 2018-11-25 | End: 2018-12-07

## 2018-11-25 RX ORDER — ONDANSETRON 4 MG/1
4 TABLET, ORALLY DISINTEGRATING ORAL ONCE
Status: COMPLETED | OUTPATIENT
Start: 2018-11-25 | End: 2018-11-25

## 2018-11-25 RX ADMIN — NAPROXEN 500 MG: 500 TABLET ORAL at 02:09

## 2018-11-25 RX ADMIN — ONDANSETRON 4 MG: 4 TABLET, ORALLY DISINTEGRATING ORAL at 01:30

## 2018-11-25 ASSESSMENT — PAIN SCALES - GENERAL
PAINLEVEL_OUTOF10: 5
PAINLEVEL_OUTOF10: 5

## 2018-11-25 NOTE — ED NOTES
Medicated pt with zofran for n/v.  Awaiting receipt of pain meds.  Provided discharge instructions about medication and filling prescription for pt and her .

## 2018-11-25 NOTE — LETTER
11/27/2018               Ameena Hines  [address]  [facility] [address]        Dear Ameena (MR#[id number])    This letter is sent in regards to your, recent visit to the [facility] Emergency Department on 11/24/2018.  During the visit, tests were performed to assist the physician in a medical diagnosis.  A review of those tests requires that we notify you of the following:    Your urine culture was POSITIVE for a bacteria called Escherichia coli. The antibiotic prescribed for you (cefdinir) should be active to treat this bacteria. IT IS IMPORTANT THAT YOU CONTINUE TAKING YOUR ANTIBIOTIC UNTIL IT IS FINISHED.      Please feel free to contact me at the number below if you have any questions or concerns. Thank you for your cooperation in the matter.    Sincerely,  ED Culture Follow-Up Staff  Nirali Rodas, PharmD    [facility], Emergency Department  [facility] [place], [place] [address]  [phone]

## 2018-11-25 NOTE — ED TRIAGE NOTES
"Ameena Hines  43 y.o. female  Chief Complaint   Patient presents with   • UTI     Bloody urination, +burning +increased frequency +n/v +lower back pain       Pt amb to triage with steady gait for above complaint. Pt states the current symptoms have been present for 4 days.  Pt is alert and oriented, speaking in full sentences, follows commands and responds appropriately to questions. NAD. Resp are even and unlabored.  Pt placed in lobby. Pt educated on triage process. Pt encouraged to alert staff for any changes.  /86   Pulse 88   Temp 36.7 °C (98.1 °F) (Temporal)   Resp 16   Ht 1.499 m (4' 11\")   Wt 67 kg (147 lb 11.3 oz)   SpO2 99%   BMI 29.83 kg/m²     "

## 2018-11-25 NOTE — ED PROVIDER NOTES
ED Provider Note    CHIEF COMPLAINT  Chief Complaint   Patient presents with   • UTI     Bloody urination, +burning +increased frequency +n/v +lower back pain       HPI   Ameena Hines is a 43 y.o. female who presents with dysuria over the past few days.  Has burning with urination and notes some blood.  Denies vaginal bleeding.  No blood from her stool.  Had one episode of vomiting today.  No active fevers.  Has lower back pain however no specific flank pain.  No rashes or skin changes.  No chest pain or trouble breathing.  Has had urinary tract infection in the past with similar symptoms.    REVIEW OF SYSTEMS  See HPI for further details. All other systems are negative.     PAST MEDICAL HISTORY   has a past medical history of Chickenpox; Hyperlipidemia; Hypertension; and Thyroid disease.    SOCIAL HISTORY  Social History     Social History Main Topics   • Smoking status: Never Smoker   • Smokeless tobacco: Never Used   • Alcohol use No   • Drug use: No   • Sexual activity: Yes     Partners: Male       SURGICAL HISTORY   has a past surgical history that includes laparoscopy.    CURRENT MEDICATIONS  Home Medications     Reviewed by Manjula Winters R.N. (Registered Nurse) on 11/25/18 at 0056  Med List Status: Not Addressed   Medication Last Dose Status   albuterol (PROAIR HFA) 108 (90 Base) MCG/ACT Aero Soln inhalation aerosol  Active   azithromycin (ZITHROMAX) 250 MG Tab Taking Active   benzonatate (TESSALON) 100 MG Cap Taking Active   budesonide (PULMICORT FLEXHALER) 90 MCG/ACT AEROSOL POWDER, BREATH ACTIVATED  Active   budesonide (PULMICORT FLEXHALER) 90 MCG/ACT AEROSOL POWDER, BREATH ACTIVATED  Active   chlorthalidone (HYGROTON) 25 MG Tab Taking Active   levothyroxine (SYNTHROID) 50 MCG Tab Taking Active   loratadine (CLARITIN) 10 MG Tab Not Taking Active   omeprazole (PRILOSEC) 40 MG delayed-release capsule  Active                ALLERGIES  No Known Allergies    PHYSICAL EXAM  VITAL SIGNS: /86   Pulse  "88   Temp 36.7 °C (98.1 °F) (Temporal)   Resp 16   Ht 1.499 m (4' 11\")   Wt 67 kg (147 lb 11.3 oz)   SpO2 99%   BMI 29.83 kg/m²   Pulse ox interpretation: I interpret this pulse ox as normal.  Constitutional: Alert in no apparent distress.  HENT: No signs of trauma, Bilateral external ears normal, Nose normal.   Eyes: Pupils are equal and reactive, Conjunctiva normal, Non-icteric.   Neck: Normal range of motion, No tenderness, Supple, No stridor.   Cardiovascular: Regular rate and rhythm, no murmurs.   Thorax & Lungs: Normal breath sounds, No respiratory distress  Abdomen: Bowel sounds normal, Soft, suprapubic tenderness, No masses, No pulsatile masses. No peritoneal signs.  Skin: Warm, Dry, No erythema, No rash.   Back: No bony tenderness, No CVA tenderness.   Extremities: Intact distal pulses, No edema, No tenderness, No cyanosis  Neurologic: Alert, No focal deficits noted.       DIAGNOSTIC STUDIES / PROCEDURES      LABS  Labs Reviewed   URINALYSIS,CULTURE IF INDICATED - Abnormal; Notable for the following:        Result Value    Character Cloudy (*)     Ketones Trace (*)     Protein 100 (*)     Nitrite Positive (*)     Leukocyte Esterase Moderate (*)     Occult Blood Large (*)     All other components within normal limits   URINE MICROSCOPIC (W/UA) - Abnormal; Notable for the following:     WBC 10-20 (*)     RBC >150 (*)     Bacteria Rare (*)     All other components within normal limits   HCG QUALITATIVE UR   REFRACTOMETER SG   URINE CULTURE(NEW)       RADIOLOGY  No orders to display       COURSE & MEDICAL DECISION MAKING  Pertinent Labs & Imaging studies reviewed. (See chart for details)  43 y.o. female presenting with dysuria and suprapubic discomfort with episode of vomiting.  No active fevers, tachycardia.  Patient does not appear to be in any distress.  No peritoneal findings.  No flank tenderness.  No vomiting here in the emergency department however has had vomiting episode at home.  Was treated " "with Zofran.  Urinalysis showing evidence of urinary tract infection with hematuria - hemorrhagic cystitis.  No flank tenderness or pain at this time to suggest nephrolithiasis.  Recommending antibiotics and follow-up with primary care physician for further management.  May require further urology follow-up should hematuria not resolved with treatment.    The patient will not drink alcohol nor drive with prescribed medications.   The patient will return for worsening symptoms or failure of improvement and is stable at the time of discharge. The patient verbalizes understanding in their own words.    /72   Pulse 90   Temp 36.7 °C (98.1 °F) (Temporal)   Resp 16   Ht 1.499 m (4' 11\")   Wt 67 kg (147 lb 11.3 oz)   SpO2 95%   BMI 29.83 kg/m²     The patient was referred to primary care where they will receive further BP management.      Mani Ball M.D.  [address] [place] [id number] [phone]    Schedule an appointment as soon as possible for a visit       [facility], Emergency Dept  [address] [facility] [address] [phone]    As needed, If symptoms worsen      FINAL IMPRESSION  1. Acute UTI    2. Acute cystitis with hematuria            Electronically signed by: Rex Cuevas, 11/25/2018 12:57 AM    "

## 2018-11-27 LAB
BACTERIA UR CULT: ABNORMAL
BACTERIA UR CULT: ABNORMAL
SIGNIFICANT IND 70042: ABNORMAL
SITE SITE: ABNORMAL
SOURCE SOURCE: ABNORMAL

## 2018-11-27 NOTE — ED NOTES
"ED Positive Culture Follow-up/Notification Note:    Date: 11/27/18     Patient seen in the ED on 11/24/2018 for dysuria for several days prior to presenting to the ED; along with burning with urination. Patient also complains over lower back, but not specific flank, pain.    1. Acute UTI    2. Acute cystitis with hematuria       Discharge Medication List as of 11/25/2018  1:16 AM      START taking these medications    Details   cefdinir (OMNICEF) 300 MG Cap Take 1 Cap by mouth 2 times a day for 7 days., Disp-14 Cap, R-0, Print Rx Paper             Allergies: Patient has no known allergies.     Vitals:    11/24/18 2346 11/25/18 0007 11/25/18 0100 11/25/18 0200   BP: 126/86  125/72    Pulse: 88  89 90   Resp: 16  16 16   Temp: 36.7 °C (98.1 °F)      TempSrc: Temporal      SpO2: 99%  95%    Weight:  67 kg (147 lb 11.3 oz)     Height:  1.499 m (4' 11\")         Final cultures:   Results     Procedure Component Value Units Date/Time    URINE CULTURE(NEW) [[id number]]  (Abnormal)  (Susceptibility) Collected:  11/25/18 0006    Order Status:  Completed Specimen:  Urine Updated:  11/27/18 0703     Significant Indicator POS (POS)     Source UR     Site --     Urine Culture Mixed skin luly 10-50,000 cfu/mL (A)      Escherichia coli  >100,000 cfu/mL   (A)    Culture & Susceptibility     ESCHERICHIA COLI     Antibiotic Sensitivity Microscan Unit Status    Ampicillin Sensitive <=8 mcg/mL Final    Method: SENSITIVITY, ANMOL    Cefepime Sensitive <=8 mcg/mL Final    Method: SENSITIVITY, ANMLO    Cefotaxime Sensitive <=2 mcg/mL Final    Method: SENSITIVITY, ANMOL    Cefotetan Sensitive <=16 mcg/mL Final    Method: SENSITIVITY, ANMOL    Ceftazidime Sensitive <=1 mcg/mL Final    Method: SENSITIVITY, ANMOL    Ceftriaxone Sensitive <=8 mcg/mL Final    Method: SENSITIVITY, ANMOL    Cefuroxime Sensitive <=4 mcg/mL Final    Method: SENSITIVITY, ANMOL    Cephalothin Intermediate 16 mcg/mL Final    Method: SENSITIVITY, ANMOL    Ciprofloxacin Sensitive <=1 " mcg/mL Final    Method: SENSITIVITY, ANMOL    Gentamicin Sensitive <=4 mcg/mL Final    Method: SENSITIVITY, ANMOL    Levofloxacin Sensitive <=2 mcg/mL Final    Method: SENSITIVITY, ANMOL    Nitrofurantoin Sensitive <=32 mcg/mL Final    Method: SENSITIVITY, ANMOL    Pip/Tazobactam Sensitive <=16 mcg/mL Final    Method: SENSITIVITY, ANMOL    Piperacillin Sensitive <=16 mcg/mL Final    Method: SENSITIVITY, ANMOL    Tigecycline Sensitive <=2 mcg/mL Final    Method: SENSITIVITY, ANMOL    Tobramycin Sensitive <=4 mcg/mL Final    Method: SENSITIVITY, ANMOL    Trimeth/Sulfa Sensitive <=2/38 mcg/mL Final    Method: SENSITIVITY, ANMOL                       URINALYSIS,CULTURE IF INDICATED [[id number]]  (Abnormal) Collected:  11/25/18 0006    Order Status:  Completed Specimen:  Urine Updated:  11/25/18 0112     Micro Urine Req Microscopic     Color Red     Character Cloudy (A)     Specific Gravity 1.020     Ph 7.5     Glucose Negative mg/dL      Ketones Trace (A) mg/dL      Protein 100 (A) mg/dL      Bilirubin Negative     Urobilinogen, Urine 1.0     Nitrite Positive (A)     Leukocyte Esterase Moderate (A)     Occult Blood Large (A)          Plan:   Appropriate antibiotic therapy prescribed. No changes required based upon culture result.  Sent letter to patient to notify of positive culture result and encourage compliance with prescribed antibiotics.     Nirali Rodas, PharmD

## 2018-12-05 ENCOUNTER — APPOINTMENT (OUTPATIENT)
Dept: PULMONOLOGY | Facility: MEDICAL CENTER | Age: 43
End: 2018-12-05
Payer: COMMERCIAL

## 2018-12-07 ENCOUNTER — OFFICE VISIT (OUTPATIENT)
Dept: INTERNAL MEDICINE | Facility: MEDICAL CENTER | Age: 43
End: 2018-12-07
Payer: COMMERCIAL

## 2018-12-07 VITALS
HEIGHT: 59 IN | TEMPERATURE: 97.7 F | WEIGHT: 136.2 LBS | SYSTOLIC BLOOD PRESSURE: 112 MMHG | DIASTOLIC BLOOD PRESSURE: 78 MMHG | HEART RATE: 66 BPM | BODY MASS INDEX: 27.46 KG/M2 | OXYGEN SATURATION: 97 %

## 2018-12-07 DIAGNOSIS — J98.4 RESTRICTIVE LUNG DISEASE: ICD-10-CM

## 2018-12-07 DIAGNOSIS — Z00.00 HEALTH CARE MAINTENANCE: ICD-10-CM

## 2018-12-07 DIAGNOSIS — I10 ESSENTIAL HYPERTENSION: ICD-10-CM

## 2018-12-07 DIAGNOSIS — E66.3 OVERWEIGHT (BMI 25.0-29.9): ICD-10-CM

## 2018-12-07 DIAGNOSIS — R71.8 LOW MEAN CORPUSCULAR VOLUME (MCV): ICD-10-CM

## 2018-12-07 PROCEDURE — 99213 OFFICE O/P EST LOW 20 MIN: CPT | Mod: GE | Performed by: INTERNAL MEDICINE

## 2018-12-07 ASSESSMENT — PAIN SCALES - GENERAL: PAINLEVEL: 3=SLIGHT PAIN

## 2018-12-07 NOTE — LETTER
[facility]  Mani Ball M.D.  [address]  [place] [address]  Fax: [phone]   Authorization for Release/Disclosure of   Protected Health Information   Name: REGINE CHAMBERLAIN : 1975 SSN: [id number]   Address: [facility] [address]  [place] [address] Phone:    [phone] (home)    I authorize the entity listed below to release/disclose the PHI below to:   [facility]/Mani Ball M.D. and Mani Ball M.D.   Provider or Entity Name:  Dr Negin Winter   Address   [facility] [place]  [address] [place] [address]    Phone:  [phone]       Fax:     Reason for request: continuity of care   Information to be released:    [  ] LAST COLONOSCOPY,  including any PATH REPORT and follow-up  [  ] LAST FIT/COLOGUARD RESULT [  ] LAST DEXA  [  ] LAST MAMMOGRAM  [  ] LAST PAP  [  ] LAST LABS [  ] RETINA EXAM REPORT  [  ] IMMUNIZATION RECORDS  [  x] Release all info      [ x ] Check here and initial the line next to each item to release ALL health information INCLUDING  _____ Care and treatment for drug and / or alcohol abuse  _____ HIV testing, infection status, or AIDS  _____ Genetic Testing    DATES OF SERVICE OR TIME PERIOD TO BE DISCLOSED: _____all________  I understand and acknowledge that:  * This Authorization may be revoked at any time by you in writing, except if your health information has already been used or disclosed.  * Your health information that will be used or disclosed as a result of you signing this authorization could be re-disclosed by the recipient. If this occurs, your re-disclosed health information may no longer be protected by State or Federal laws.  * You may refuse to sign this Authorization. Your refusal will not affect your ability to obtain treatment.  * This Authorization becomes effective upon signing and will  on (date) __________.      If no date is indicated, this Authorization will  one (1) year from the signature date.    Name: Regine  Donny    Signature:   Date:     12/7/2018       PLEASE FAX REQUESTED RECORDS BACK TO: [phone]

## 2018-12-07 NOTE — PROGRESS NOTES
At the time of the visit, I discussed that patient's medical history, physical examination, laboratory results/studies, and assessment and I agree with the plan documented by the resident physician.    Coming in for f/u  HTN - on chorthalidone, BMP to monitor K and CBC for baseline microcytosis  Follows with pulmonary RE: PFT show restrictive lung dz based on BMI, getting methacholine challenge? will need to follow pulm work to see what they are going for.     UTI improved with cefdinir.     Abraham Barlow M.D.

## 2018-12-07 NOTE — PATIENT INSTRUCTIONS
Pulmonary Nodule  A pulmonary nodule is a small, round growth of tissue in the lung. Pulmonary nodules can range in size from less than 1/5 inch (4 mm) to a little bigger than an inch (25 mm). Most pulmonary nodules are detected when imaging tests of the lung are being performed for a different problem. Pulmonary nodules are usually not cancerous (benign). However, some pulmonary nodules are cancerous (malignant). Follow-up treatment or testing is based on the size of the pulmonary nodule and your risk of getting lung cancer.  What are the causes?  Benign pulmonary nodules can be caused by various things. Some of the causes include:  · Bacterial, fungal, or viral infections. This is usually an old infection that is no longer active, but it can sometimes be a current, active infection.  · A benign mass of tissue.  · Inflammation from conditions such as rheumatoid arthritis.  · Abnormal blood vessels in the lungs.  Malignant pulmonary nodules can result from lung cancer or from cancers that spread to the lung from other places in the body.  What are the signs or symptoms?  Pulmonary nodules usually do not cause symptoms.  How is this diagnosed?  Most often, pulmonary nodules are found incidentally when an X-ray or CT scan is performed to look for some other problem in the lung area. To help determine whether a pulmonary nodule is benign or malignant, your health care provider will take a medical history and order a variety of tests. Tests done may include:  · Blood tests.  · A skin test called a tuberculin test. This test is used to determine if you have been exposed to the germ that causes tuberculosis.  · Chest X-rays. If possible, a new X-ray may be compared with X-rays you have had in the past.  · CT scan. This test shows smaller pulmonary nodules more clearly than an X-ray.  · Positron emission tomography (PET) scan. In this test, a safe amount of a radioactive substance is injected into the bloodstream. Then,  the scan takes a picture of the pulmonary nodule. The radioactive substance is eliminated from your body in your urine.  · Biopsy. A tiny piece of the pulmonary nodule is removed so it can be checked under a microscope.  How is this treated?  Pulmonary nodules that are benign normally do not require any treatment because they usually do not cause symptoms or breathing problems. Your health care provider may want to monitor the pulmonary nodule through follow-up CT scans. The frequency of these CT scans will vary based on the size of the nodule and the risk factors for lung cancer. For example, CT scans will need to be done more frequently if the pulmonary nodule is larger and if you have a history of smoking and a family history of cancer. Further testing or biopsies may be done if any follow-up CT scan shows that the size of the pulmonary nodule has increased.  Follow these instructions at home:  · Only take over-the-counter or prescription medicines as directed by your health care provider.  · Keep all follow-up appointments with your health care provider.  Contact a health care provider if:  · You have trouble breathing when you are active.  · You feel sick or unusually tired.  · You do not feel like eating.  · You lose weight without trying to.  · You develop chills or night sweats.  Get help right away if:  · You cannot catch your breath, or you begin wheezing.  · You cannot stop coughing.  · You cough up blood.  · You become dizzy or feel like you are going to pass out.  · You have sudden chest pain.  · You have a fever or persistent symptoms for more than 2-3 days.  · You have a fever and your symptoms suddenly get worse.  This information is not intended to replace advice given to you by your health care provider. Make sure you discuss any questions you have with your health care provider.  Document Released: 10/15/2010 Document Revised: 05/25/2017 Document Reviewed: 06/09/2014  Rundown Patient  Education © 2017 [employer]  Hypertension  Hypertension, commonly called high blood pressure, is when the force of blood pumping through your arteries is too strong. Your arteries are the blood vessels that carry blood from your heart throughout your body. A blood pressure reading consists of a higher number over a lower number, such as 110/72. The higher number (systolic) is the pressure inside your arteries when your heart pumps. The lower number (diastolic) is the pressure inside your arteries when your heart relaxes. Ideally you want your blood pressure below 120/80.  Hypertension forces your heart to work harder to pump blood. Your arteries may become narrow or stiff. Having untreated or uncontrolled hypertension can cause heart attack, stroke, kidney disease, and other problems.  What increases the risk?  Some risk factors for high blood pressure are controllable. Others are not.  Risk factors you cannot control include:  · Race. You may be at higher risk if you are .  · Age. Risk increases with age.  · Gender. Men are at higher risk than women before age 45 years. After age 65, women are at higher risk than men.  Risk factors you can control include:  · Not getting enough exercise or physical activity.  · Being overweight.  · Getting too much fat, sugar, calories, or salt in your diet.  · Drinking too much alcohol.  What are the signs or symptoms?  Hypertension does not usually cause signs or symptoms. Extremely high blood pressure (hypertensive crisis) may cause headache, anxiety, shortness of breath, and nosebleed.  How is this diagnosed?  To check if you have hypertension, your health care provider will measure your blood pressure while you are seated, with your arm held at the level of your heart. It should be measured at least twice using the same arm. Certain conditions can cause a difference in blood pressure between your right and left arms. A blood pressure reading that is higher  than normal on one occasion does not mean that you need treatment. If it is not clear whether you have high blood pressure, you may be asked to return on a different day to have your blood pressure checked again. Or, you may be asked to monitor your blood pressure at home for 1 or more weeks.  How is this treated?  Treating high blood pressure includes making lifestyle changes and possibly taking medicine. Living a healthy lifestyle can help lower high blood pressure. You may need to change some of your habits.  Lifestyle changes may include:  · Following the DASH diet. This diet is high in fruits, vegetables, and whole grains. It is low in salt, red meat, and added sugars.  · Keep your sodium intake below 2,300 mg per day.  · Getting at least 30-45 minutes of aerobic exercise at least 4 times per week.  · Losing weight if necessary.  · Not smoking.  · Limiting alcoholic beverages.  · Learning ways to reduce stress.  Your health care provider may prescribe medicine if lifestyle changes are not enough to get your blood pressure under control, and if one of the following is true:  · You are 18-59 years of age and your systolic blood pressure is above 140.  · You are 60 years of age or older, and your systolic blood pressure is above 150.  · Your diastolic blood pressure is above 90.  · You have diabetes, and your systolic blood pressure is over 140 or your diastolic blood pressure is over 90.  · You have kidney disease and your blood pressure is above 140/90.  · You have heart disease and your blood pressure is above 140/90.  Your personal target blood pressure may vary depending on your medical conditions, your age, and other factors.  Follow these instructions at home:  · Have your blood pressure rechecked as directed by your health care provider.  · Take medicines only as directed by your health care provider. Follow the directions carefully. Blood pressure medicines must be taken as prescribed. The medicine does  not work as well when you skip doses. Skipping doses also puts you at risk for problems.  · Do not smoke.  · Monitor your blood pressure at home as directed by your health care provider.  Contact a health care provider if:  · You think you are having a reaction to medicines taken.  · You have recurrent headaches or feel dizzy.  · You have swelling in your ankles.  · You have trouble with your vision.  Get help right away if:  · You develop a severe headache or confusion.  · You have unusual weakness, numbness, or feel faint.  · You have severe chest or abdominal pain.  · You vomit repeatedly.  · You have trouble breathing.  This information is not intended to replace advice given to you by your health care provider. Make sure you discuss any questions you have with your health care provider.  Document Released: 12/18/2006 Document Revised: 05/25/2017 Document Reviewed: 10/10/2014  [employer] Patient Education © 2017 [employer]

## 2018-12-09 NOTE — PROGRESS NOTES
Established Patient    Ameena presents today with the following:    CC: f/u on chronic problems and review labs    HPI: Patient is a 43-year-old female with PMHx of HTN,hypothyroidism comes here to follow-up HTN and chronic cough.     # Cough: Patient had about 6-8 months of unresolving cough without much help of several interventions.Following up with Pulmonology who worked for [facility]arsias -which came back possible positive.PFT's consistent with restrictive pattern. Using pulmicort and albuterol. Since last visit patient states her cough has improved. Pulmonology ordered methacholine challenge.    # HTN:Diagnosed many years back and was on cardizem.Switched to lisinopril and dose was increased to 20 mg for better control of blood pressure. She began to have cough and lisinopril was switched to losartan and later to chlorthalidone. BP has been controlled and stable with current dose of chlorthalidone.    Patient Active Problem List    Diagnosis Date Noted   • Lung nodule 10/11/2018   • Restrictive lung disease 09/14/2018   • Allergic rhinitis 04/16/2018   • Overweight (BMI 25.0-29.9) 04/02/2018   • Hypertension 03/22/2018   • Hypothyroidism 03/22/2018   • Hyperlipidemia 03/22/2018   • Hot flashes due to menopause 03/22/2018       Current Outpatient Prescriptions   Medication Sig Dispense Refill   • omeprazole (PRILOSEC) 40 MG delayed-release capsule Take 1 Cap by mouth every day. 30 Cap 2   • budesonide (PULMICORT FLEXHALER) 90 MCG/ACT AEROSOL POWDER, BREATH ACTIVATED Inhale 2 Puffs by mouth 2 Times a Day. 1 Each 6   • albuterol (PROAIR HFA) 108 (90 Base) MCG/ACT Aero Soln inhalation aerosol Inhale 2 Puffs by mouth every 6 hours as needed for Shortness of Breath. 8.5 g 3   • chlorthalidone (HYGROTON) 25 MG Tab TAKE ONE TABLET BY MOUTH ONE TIME DAILY  30 Tab 0   • levothyroxine (SYNTHROID) 50 MCG Tab take 1 tablet by mouth every morning on an empty stomach 90 Tab 0   • loratadine (CLARITIN) 10 MG Tab Take 1  "Tab by mouth every day. 60 Tab 2     No current facility-administered medications for this visit.        Social History     Social History   • Marital status:      Spouse name: N/A   • Number of children: N/A   • Years of education: N/A     Occupational History   • Not on file.     Social History Main Topics   • Smoking status: Never Smoker   • Smokeless tobacco: Never Used   • Alcohol use No   • Drug use: No   • Sexual activity: Yes     Partners: Male     Other Topics Concern   • Not on file     Social History Narrative   • No narrative on file       Family History   Problem Relation Age of Onset   • Hypertension Mother    • Hyperlipidemia Mother        ROS: As per HPI. Additional pertinent symptoms as noted below.    Constitutional: Denies fever/chills/weight changes.   Eyes: Denies changes/pain in vision  ENT: Denies sore throat/congestion/ear ache.   Cardiovascular: Denies chest pain /palpitations/edema.   Respiratory: Denies SOB/cough/PND/orthopnea  Abdomen: Denies difficulty swallowing/ diarrhea/constipation/abdominal pain/nausea/vomiting  Genitourinary: Normal urinary habits.   Musculo-skeletal: normal ambulation.Denies muscle or joint pains.  Skin: Denies rash/lesions.  Neurological: Denies weakness/tingling/numbness/headache  Psychological: good mood and cooperative. Denies anxiety /depression       /78 (BP Location: Left arm, Patient Position: Sitting)   Pulse 66   Temp 36.5 °C (97.7 °F) (Temporal)   Ht 1.499 m (4' 11\")   Wt 61.8 kg (136 lb 3.2 oz)   SpO2 97%   BMI 27.51 kg/m²     Physical Exam  General:  Alert and oriented, No apparent distress.    Eyes: Pupils equal and reactive. No scleral icterus.    Throat: Clear no erythema or exudates noted.    Neck: Supple. No lymphadenopathy noted. Thyroid not enlarged.    Lungs: Clear to auscultation and percussion bilaterally.    Cardiovascular: Regular rate and rhythm. No murmurs, rubs or gallops.    Abdomen:  Benign. No rebound or guarding " noted.    Extremities: No clubbing, cyanosis, edema.    Skin: Clear. No rash or suspicious skin lesions noted.    Neurological: Oriented to time, place, and person .Cranial nerves intact. No motor/sensory deficits.Reflexes were normal and symmetrical in both upper and lower extremities     Musculoskeletal : NROM of all extremities. No tenderness or deformity noted.     Note: I have reviewed all pertinent labs and diagnostic tests associated with this visit with specific comments listed under the assessment and plan below      Assessment and Plan    1. Essential hypertension  - today BP is 112/78 and denies any related symptoms.  - currently on chlorthalidone with good control  - will continue to follow up with repeat BMP to monitor +K    2. Low mean corpuscular volume (MCV)  Noted in last CBC  Denies related symptoms  Hb/Hct stable and within normal range  Ordered repeat CBC and further work up based on results if microcytosis persist- Iron panel in future    3. Restrictive lung disease  - Patient presents with an ongoing cough for the past 6 months and has underwent several evaluations and treatments without success.   - Quantiferon gold test negative.  - with possibility of GERD- in prior visits patient was advise to take PPI with out much help  -PFT's showed -Moderate intrathoracic restrictive lung disease, lack of DLCO measurement, precludes diagnosis of DLCO or pulmonary vascular disease.  - Chest x-ray negative  - other differentials to rule out systemic causes-  ALEXIA, Sjogrens's panel, Connective tissue diseases panel, systemic sclerosis panel, RF, Anti CCP, CK, aldolase- last visit negative except for positive ALEXIA.  - high resolution CT showed ---No evidence for interstitial lung disease. 3 subpleural nodule in the right middle lobe measuring 5 mm, 4 mm, 3 mm. Right hepatic lobe calcification which is most likely postinflammatory. Probable hepatomegaly  - following up with Pulmonology and work up for  pertussis and IgE showed possible Pertussis. Last visit with pulmonology recommended methacholine challenge test.    4. Overweight (BMI 25.0-29.9)  -Advised to eat healthy-food rich in fruits and vegetables-fiber, limiting carbohydrates.  -Advised to exercise regularly at least 30 minutes a day for 5 days a week    5. Health care maintenance  - had influenza vaccine this season  - Tdap 4/2/2018  - pap smear within 1 yr and negative  - mammogram 1-2 yrs back negative  - not a candidate for colonoscopy or DEXA scan yet  - denies smoking,drinking alcohol or illicit drug use  - given her high BMI and comorbidities- advised to eat healthy- DASH diet and exercise regularly atleast 30 mins a day for 5 days a week.      Signed by: Mani Ball M.D.

## 2018-12-18 ENCOUNTER — OFFICE VISIT (OUTPATIENT)
Dept: URGENT CARE | Facility: CLINIC | Age: 43
End: 2018-12-18
Payer: COMMERCIAL

## 2018-12-18 VITALS
BODY MASS INDEX: 27.42 KG/M2 | RESPIRATION RATE: 16 BRPM | HEART RATE: 78 BPM | TEMPERATURE: 98.3 F | WEIGHT: 136 LBS | OXYGEN SATURATION: 99 % | HEIGHT: 59 IN | SYSTOLIC BLOOD PRESSURE: 120 MMHG | DIASTOLIC BLOOD PRESSURE: 76 MMHG

## 2018-12-18 DIAGNOSIS — L23.9 ALLERGIC DERMATITIS: Primary | ICD-10-CM

## 2018-12-18 PROCEDURE — 99214 OFFICE O/P EST MOD 30 MIN: CPT | Performed by: PHYSICIAN ASSISTANT

## 2018-12-18 RX ORDER — TRIAMCINOLONE ACETONIDE 1 MG/G
1 OINTMENT TOPICAL 2 TIMES DAILY
Qty: 1 TUBE | Refills: 0 | Status: SHIPPED | OUTPATIENT
Start: 2018-12-18 | End: 2018-12-28

## 2018-12-18 ASSESSMENT — ENCOUNTER SYMPTOMS
RHINORRHEA: 0
VOMITING: 0
DIARRHEA: 0
SPUTUM PRODUCTION: 0
WHEEZING: 0
SHORTNESS OF BREATH: 0
FEVER: 0
COUGH: 0
SORE THROAT: 0

## 2018-12-19 NOTE — PATIENT INSTRUCTIONS
Avoid Eggplant     Continue Claritin for 10 days   Start triamcinolone ointment twice a day for 10 days    Moisturizer daily with Cervae or Cetaphil cream       Contact Dermatitis  Introduction  Dermatitis is redness, soreness, and swelling (inflammation) of the skin. Contact dermatitis is a reaction to certain substances that touch the skin. You either touched something that irritated your skin, or you have allergies to something you touched.  Follow these instructions at home:  Skin Care  · Moisturize your skin as needed.  · Apply cool compresses to the affected areas.  · Try taking a bath with:  ¨ Epsom salts. Follow the instructions on the package. You can get these at a pharmacy or grocery store.  ¨ Baking soda. Pour a small amount into the bath as told by your doctor.  ¨ Colloidal oatmeal. Follow the instructions on the package. You can get this at a pharmacy or grocery store.  · Try applying baking soda paste to your skin. Stir water into baking soda until it looks like paste.  · Do not scratch your skin.  · Bathe less often.  · Bathe in lukewarm water. Avoid using hot water.  Medicines  · Take or apply over-the-counter and prescription medicines only as told by your doctor.  · If you were prescribed an antibiotic medicine, take or apply your antibiotic as told by your doctor. Do not stop taking the antibiotic even if your condition starts to get better.  General instructions  · Keep all follow-up visits as told by your doctor. This is important.  · Avoid the substance that caused your reaction. If you do not know what caused it, keep a journal to try to track what caused it. Write down:  ¨ What you eat.  ¨ What cosmetic products you use.  ¨ What you drink.  ¨ What you wear in the affected area. This includes jewelry.  · If you were given a bandage (dressing), take care of it as told by your doctor. This includes when to change and remove it.  Contact a doctor if:  · You do not get better with  treatment.  · Your condition gets worse.  · You have signs of infection such as:  ¨ Swelling.  ¨ Tenderness.  ¨ Redness.  ¨ Soreness.  ¨ Warmth.  · You have a fever.  · You have new symptoms.  Get help right away if:  · You have a very bad headache.  · You have neck pain.  · Your neck is stiff.  · You throw up (vomit).  · You feel very sleepy.  · You see red streaks coming from the affected area.  · Your bone or joint underneath the affected area becomes painful after the skin has healed.  · The affected area turns darker.  · You have trouble breathing.  This information is not intended to replace advice given to you by your health care provider. Make sure you discuss any questions you have with your health care provider.  Document Released: 10/15/2010 Document Revised: 05/25/2017 Document Reviewed: 05/04/2016  © 2017 [employer]

## 2018-12-19 NOTE — PROGRESS NOTES
Subjective:   Ameena Hines is a 43 y.o. female who presents for Rash (x3-4 days, rash on arms, abdomen, face and eyes, itchy and burning) and Back Pain (whne taking deep breaths, )        Rash   This is a new problem. Episode onset: 3-4 days. The problem is unchanged. The affected locations include the abdomen (right and left arm, face). Associated with: pt had eggplant 2 days before rash, possible previous reaction to egg plant.  Associated symptoms include facial edema. Pertinent negatives include no congestion, cough, diarrhea, fever, rhinorrhea, shortness of breath, sore throat or vomiting. Treatments tried: Claritin. The treatment provided mild relief. Her past medical history is significant for allergies and asthma. There is no history of eczema.     Patient has a history of asthma and allergies.  She does recall previous incident where she had a complaint causing a rash a few years ago.    Review of Systems   Constitutional: Negative for fever.   HENT: Negative for congestion, rhinorrhea and sore throat.    Respiratory: Negative for cough, sputum production, shortness of breath and wheezing.    Gastrointestinal: Negative for diarrhea and vomiting.   Skin: Positive for rash.       PMH:  has a past medical history of Chickenpox; Hyperlipidemia; Hypertension; and Thyroid disease.  MEDS:   Current Outpatient Prescriptions:   •  triamcinolone acetonide (KENALOG) 0.1 % Ointment, Apply 1 Quantity Sufficient to affected area(s) 2 times a day for 10 days., Disp: 1 Tube, Rfl: 0  •  chlorthalidone (HYGROTON) 25 MG Tab, TAKE ONE TABLET BY MOUTH ONE TIME DAILY , Disp: 30 Tab, Rfl: 0  •  omeprazole (PRILOSEC) 40 MG delayed-release capsule, Take 1 Cap by mouth every day., Disp: 30 Cap, Rfl: 2  •  levothyroxine (SYNTHROID) 50 MCG Tab, take 1 tablet by mouth every morning on an empty stomach, Disp: 90 Tab, Rfl: 0  •  budesonide (PULMICORT FLEXHALER) 90 MCG/ACT AEROSOL POWDER, BREATH ACTIVATED, Inhale 2 Puffs by mouth 2 Times  "a Day., Disp: 1 Each, Rfl: 6  •  albuterol (PROAIR HFA) 108 (90 Base) MCG/ACT Aero Soln inhalation aerosol, Inhale 2 Puffs by mouth every 6 hours as needed for Shortness of Breath., Disp: 8.5 g, Rfl: 3  •  loratadine (CLARITIN) 10 MG Tab, Take 1 Tab by mouth every day., Disp: 60 Tab, Rfl: 2  ALLERGIES: No Known Allergies  SURGHX:   Past Surgical History:   Procedure Laterality Date   • LAPAROSCOPY       SOCHX:  reports that she has never smoked. She has never used smokeless tobacco. She reports that she does not drink alcohol or use drugs.  Family History   Problem Relation Age of Onset   • Hypertension Mother    • Hyperlipidemia Mother         Objective:   /76   Pulse 78   Temp 36.8 °C (98.3 °F) (Temporal)   Resp 16   Ht 1.499 m (4' 11\")   Wt 61.7 kg (136 lb)   SpO2 99%   BMI 27.47 kg/m²     Physical Exam   Constitutional: She is oriented to person, place, and time. She appears well-developed and well-nourished. No distress.   HENT:   Head: Normocephalic and atraumatic.   Mouth/Throat: Uvula is midline. No uvula swelling. No oropharyngeal exudate, posterior oropharyngeal edema or posterior oropharyngeal erythema.   Eyes: Pupils are equal, round, and reactive to light. Conjunctivae are normal.   Neck: Normal range of motion. Neck supple. No tracheal deviation present. No thyromegaly present.   Cardiovascular: Normal rate and regular rhythm.    Pulmonary/Chest: Effort normal and breath sounds normal. No stridor. No respiratory distress. She has no wheezes. She has no rales.   Lymphadenopathy:     She has no cervical adenopathy.   Neurological: She is alert and oriented to person, place, and time.   Skin: Skin is warm and dry. Capillary refill takes less than 2 seconds.        Psychiatric: She has a normal mood and affect. Her behavior is normal.   Vitals reviewed.        Assessment/Plan:     1. Allergic dermatitis  triamcinolone acetonide (KENALOG) 0.1 % Ointment    REFERRAL TO FOLLOW-UP WITH PRIMARY " CARE     Advised patient to discontinue all suspicious products including ingredients with fragrance, dyes.  Patient can introduce products one by one monitoring for signs of recurrence of rash.  Consider switching detergent.  Recommended starting gentle cleanser and lotions for face, Cetaphil/CeraVe.  Consider food log if symptoms persist.  Treatment plan written down and dermatitis educational handout provided.     Follow-up with primary care provider within 7-10 days.  If symptoms worsen or persist patient can contact me or return to clinic for follow-up.    A thorough review of red flags and strict emergency room precautions discussed.  Patient appears understanding of information.

## 2018-12-20 ENCOUNTER — HOSPITAL ENCOUNTER (OUTPATIENT)
Dept: LAB | Facility: MEDICAL CENTER | Age: 43
End: 2018-12-20
Attending: INTERNAL MEDICINE
Payer: COMMERCIAL

## 2018-12-20 DIAGNOSIS — I10 ESSENTIAL HYPERTENSION: ICD-10-CM

## 2018-12-20 DIAGNOSIS — R71.8 LOW MEAN CORPUSCULAR VOLUME (MCV): ICD-10-CM

## 2018-12-20 LAB
ANION GAP SERPL CALC-SCNC: 11 MMOL/L (ref 0–11.9)
BASOPHILS # BLD AUTO: 1.1 % (ref 0–1.8)
BASOPHILS # BLD: 0.08 K/UL (ref 0–0.12)
BUN SERPL-MCNC: 14 MG/DL (ref 8–22)
CALCIUM SERPL-MCNC: 9.9 MG/DL (ref 8.5–10.5)
CHLORIDE SERPL-SCNC: 94 MMOL/L (ref 96–112)
CO2 SERPL-SCNC: 26 MMOL/L (ref 20–33)
CREAT SERPL-MCNC: 0.7 MG/DL (ref 0.5–1.4)
EOSINOPHIL # BLD AUTO: 0.62 K/UL (ref 0–0.51)
EOSINOPHIL NFR BLD: 8.6 % (ref 0–6.9)
ERYTHROCYTE [DISTWIDTH] IN BLOOD BY AUTOMATED COUNT: 37.4 FL (ref 35.9–50)
GLUCOSE SERPL-MCNC: 102 MG/DL (ref 65–99)
HCT VFR BLD AUTO: 40.3 % (ref 37–47)
HGB BLD-MCNC: 12.8 G/DL (ref 12–16)
IMM GRANULOCYTES # BLD AUTO: 0.02 K/UL (ref 0–0.11)
IMM GRANULOCYTES NFR BLD AUTO: 0.3 % (ref 0–0.9)
LYMPHOCYTES # BLD AUTO: 2.02 K/UL (ref 1–4.8)
LYMPHOCYTES NFR BLD: 28 % (ref 22–41)
MCH RBC QN AUTO: 24.2 PG (ref 27–33)
MCHC RBC AUTO-ENTMCNC: 31.8 G/DL (ref 33.6–35)
MCV RBC AUTO: 76.3 FL (ref 81.4–97.8)
MONOCYTES # BLD AUTO: 0.7 K/UL (ref 0–0.85)
MONOCYTES NFR BLD AUTO: 9.7 % (ref 0–13.4)
NEUTROPHILS # BLD AUTO: 3.78 K/UL (ref 2–7.15)
NEUTROPHILS NFR BLD: 52.3 % (ref 44–72)
NRBC # BLD AUTO: 0 K/UL
NRBC BLD-RTO: 0 /100 WBC
PLATELET # BLD AUTO: 351 K/UL (ref 164–446)
PMV BLD AUTO: 11.7 FL (ref 9–12.9)
POTASSIUM SERPL-SCNC: 3.5 MMOL/L (ref 3.6–5.5)
RBC # BLD AUTO: 5.28 M/UL (ref 4.2–5.4)
SODIUM SERPL-SCNC: 131 MMOL/L (ref 135–145)
WBC # BLD AUTO: 7.2 K/UL (ref 4.8–10.8)

## 2018-12-20 PROCEDURE — 85025 COMPLETE CBC W/AUTO DIFF WBC: CPT

## 2018-12-20 PROCEDURE — 80048 BASIC METABOLIC PNL TOTAL CA: CPT

## 2018-12-20 PROCEDURE — 36415 COLL VENOUS BLD VENIPUNCTURE: CPT

## 2018-12-21 ENCOUNTER — TELEPHONE (OUTPATIENT)
Dept: HOSPITALIST | Facility: MEDICAL CENTER | Age: 43
End: 2018-12-21

## 2018-12-21 DIAGNOSIS — R71.8 LOW MEAN CORPUSCULAR VOLUME (MCV): ICD-10-CM

## 2018-12-21 RX ORDER — POTASSIUM CHLORIDE 750 MG/1
10 CAPSULE, EXTENDED RELEASE ORAL DAILY
Qty: 30 CAP | Refills: 0 | Status: SHIPPED | OUTPATIENT
Start: 2018-12-21 | End: 2019-02-15

## 2018-12-28 ENCOUNTER — APPOINTMENT (OUTPATIENT)
Dept: RADIOLOGY | Facility: MEDICAL CENTER | Age: 43
End: 2018-12-28
Attending: EMERGENCY MEDICINE
Payer: COMMERCIAL

## 2018-12-28 ENCOUNTER — HOSPITAL ENCOUNTER (EMERGENCY)
Facility: MEDICAL CENTER | Age: 43
End: 2018-12-28
Attending: EMERGENCY MEDICINE
Payer: COMMERCIAL

## 2018-12-28 VITALS
BODY MASS INDEX: 27.29 KG/M2 | RESPIRATION RATE: 18 BRPM | DIASTOLIC BLOOD PRESSURE: 88 MMHG | WEIGHT: 135.36 LBS | HEIGHT: 59 IN | SYSTOLIC BLOOD PRESSURE: 132 MMHG | HEART RATE: 79 BPM | OXYGEN SATURATION: 99 % | TEMPERATURE: 97.2 F

## 2018-12-28 DIAGNOSIS — N30.01 ACUTE CYSTITIS WITH HEMATURIA: ICD-10-CM

## 2018-12-28 LAB
APPEARANCE UR: ABNORMAL
BACTERIA #/AREA URNS HPF: ABNORMAL /HPF
BILIRUB UR QL STRIP.AUTO: NEGATIVE
COLOR UR: ABNORMAL
EPI CELLS #/AREA URNS HPF: ABNORMAL /HPF
GLUCOSE UR STRIP.AUTO-MCNC: NEGATIVE MG/DL
HCG UR QL: NEGATIVE
HYALINE CASTS #/AREA URNS LPF: ABNORMAL /LPF
KETONES UR STRIP.AUTO-MCNC: NEGATIVE MG/DL
LEUKOCYTE ESTERASE UR QL STRIP.AUTO: ABNORMAL
MICRO URNS: ABNORMAL
NITRITE UR QL STRIP.AUTO: NEGATIVE
PH UR STRIP.AUTO: 8 [PH]
PROT UR QL STRIP: 100 MG/DL
RBC # URNS HPF: >150 /HPF
RBC UR QL AUTO: ABNORMAL
SP GR UR REFRACTOMETRY: 1.02
SP GR UR STRIP.AUTO: 1.02
UROBILINOGEN UR STRIP.AUTO-MCNC: 0.2 MG/DL
WBC #/AREA URNS HPF: ABNORMAL /HPF

## 2018-12-28 PROCEDURE — 81025 URINE PREGNANCY TEST: CPT

## 2018-12-28 PROCEDURE — 81001 URINALYSIS AUTO W/SCOPE: CPT

## 2018-12-28 PROCEDURE — 99284 EMERGENCY DEPT VISIT MOD MDM: CPT

## 2018-12-28 PROCEDURE — 71045 X-RAY EXAM CHEST 1 VIEW: CPT

## 2018-12-28 PROCEDURE — 87086 URINE CULTURE/COLONY COUNT: CPT

## 2018-12-28 RX ORDER — SULFAMETHOXAZOLE AND TRIMETHOPRIM 800; 160 MG/1; MG/1
1 TABLET ORAL 2 TIMES DAILY
Qty: 20 TAB | Refills: 0 | Status: SHIPPED | OUTPATIENT
Start: 2018-12-28 | End: 2019-01-07

## 2018-12-28 ASSESSMENT — LIFESTYLE VARIABLES: DO YOU DRINK ALCOHOL: NO

## 2018-12-28 ASSESSMENT — PAIN SCALES - GENERAL: PAINLEVEL_OUTOF10: 4

## 2018-12-28 NOTE — ED NOTES
Discharge instructions given to patient via  #[id number] Paulo; patient verbalized understanding. Vital signs WNL prior to discharge. Pt ambulatory with steady gait upon leaving ED.

## 2018-12-28 NOTE — ED PROVIDER NOTES
"ED Provider Note    CHIEF COMPLAINT  Chief Complaint   Patient presents with   • Cough     c/o productive cough x 1 year. worse the last few days.   • Painful Urination     started last night.   • Blood in Urine     noticed this morning.       HPI  Ameena Hines is a 43 y.o. female who presents to the emergency department with dysuria.  Patient is a history of several UTIs most recently last month.  Chart was reviewed noting E. coli infection treated appropriately with Omnicef based on culture.  Symptoms resolved, but last night started having dysuria.  Noticed blood in her urine this morning.  No fevers, flank pain, nausea, vomiting.    Second complaint is of ongoing cough.  She has had this over a year.  She has been seen by pulmonary without improvement of her symptoms.  Describes significant nasal congestion as well as mucus adduction when she coughs.  Cough seems to be worse at night and when she lays down.  No other modifying factors.    REVIEW OF SYSTEMS  As per HPI, otherwise a 10 point review of systems is negative    PAST MEDICAL HISTORY  Past Medical History:   Diagnosis Date   • Chickenpox    • Hyperlipidemia    • Hypertension    • Thyroid disease        SOCIAL HISTORY  Social History   Substance Use Topics   • Smoking status: Never Smoker   • Smokeless tobacco: Never Used   • Alcohol use No       SURGICAL HISTORY  Past Surgical History:   Procedure Laterality Date   • LAPAROSCOPY         CURRENT MEDICATIONS  Home Medications    **Home medications have not yet been reviewed for this encounter**         ALLERGIES  No Known Allergies    PHYSICAL EXAM  VITAL SIGNS: /98   Pulse 85   Temp (!) 35.7 °C (96.3 °F) (Temporal)   Resp 16   Ht 1.499 m (4' 11\")   Wt 61.4 kg (135 lb 5.8 oz)   SpO2 99%   BMI 27.34 kg/m²    Constitutional: Awake and alert  HENT:  Atraumatic, Normocephalic.Oropharynx moist mucus membranes, posterior pharynx normal, nose with mucosal edema bilaterally.  No purulent nasal " discharge  Eyes: Normal inspection  Neck: Supple  Cardiovascular: Normal heart rate, Normal rhythm.  Symmetric peripheral pulses.   Thorax & Lungs: No respiratory distress, No wheezing, No rales, No rhonchi, No chest tenderness.   Abdomen: Bowel sounds normal, soft, non-distended, nontender, no mass  Skin: Warm, Dry, No rash.   Back: No tenderness, No CVA tenderness.   Neurologic: Grossly normal   Psychiatric: Anxious appearing    RADIOLOGY/PROCEDURES  DX-CHEST-PORTABLE (1 VIEW)   Final Result      No acute cardiac or pulmonary abnormalities are identified.           Imaging is interpreted by radiologist    Labs:  Results for orders placed or performed during the hospital encounter of 12/28/18   URINALYSIS CULTURE, IF INDICATED   Result Value Ref Range    Micro Urine Req Microscopic     Color Red     Character Bloody (A)     Specific Gravity 1.020 <1.035    Ph 8.0 5.0 - 8.0    Glucose Negative Negative mg/dL    Ketones Negative Negative mg/dL    Protein 100 (A) Negative mg/dL    Bilirubin Negative Negative    Urobilinogen, Urine 0.2 Negative    Nitrite Negative Negative    Leukocyte Esterase Small (A) Negative    Occult Blood Large (A) Negative   HCG QUALITATIVE UR   Result Value Ref Range    Beta-Hcg Urine Negative Negative   REFRACTOMETER SG   Result Value Ref Range    Specific Gravity 1.020    URINE MICROSCOPIC (W/UA)   Result Value Ref Range    WBC 10-20 (A) /hpf    RBC >150 (A) /hpf    Bacteria Few (A) None /hpf    Epithelial Cells Few /hpf    Hyaline Cast 3-5 (A) /lpf         COURSE & MEDICAL DECISION MAKING  Patient presents to the ER with dysuria.  Has hematuria with bacteria and small leukocyte esterase suggesting UTI.  I sent a urine culture.  Given recurrent UTIs she will be referred to Dr. George's, urology.  I have given a prescription for Bactrim.  No signs of upper tract infection without fever, tachycardia, flank pain, vomiting.  Should do well with outpatient management.    Patient has ongoing  chronic cough.  Chest x-ray negative for infiltrate.  She should continue with plan to follow-up with pulmonology.  She did have nasal mucosal edema and I have advised trial of nasal Flonase spray.    Advised to return the ER for fever, vomiting, back pain, unable to tolerate antibiotics, difficulty breathing or concern.      FINAL IMPRESSION  1.  Hemorrhagic cystitis  2.  Chronic cough      This dictation was created using voice recognition software. The accuracy of the dictation is limited to the abilities of the software.  The nursing notes were reviewed and certain aspects of this information were incorporated into this note.      Electronically signed by: Rick Kc, 12/28/2018 9:29 AM

## 2018-12-28 NOTE — ED TRIAGE NOTES
Chief Complaint   Patient presents with   • Cough     c/o productive cough x 1 year. worse the last few days.   • Painful Urination     started last night.   • Blood in Urine     noticed this morning.     Was seen for same a month ago and diagnosed w/UTI.  Urine specimen cup provided to pt. NAD. Able to speak in full sentences.

## 2018-12-30 LAB
BACTERIA UR CULT: NORMAL
SIGNIFICANT IND 70042: NORMAL
SITE SITE: NORMAL
SOURCE SOURCE: NORMAL

## 2018-12-31 ENCOUNTER — NON-PROVIDER VISIT (OUTPATIENT)
Dept: PULMONOLOGY | Facility: HOSPICE | Age: 43
End: 2018-12-31
Payer: COMMERCIAL

## 2018-12-31 ENCOUNTER — OFFICE VISIT (OUTPATIENT)
Dept: PULMONOLOGY | Facility: HOSPICE | Age: 43
End: 2018-12-31
Payer: COMMERCIAL

## 2018-12-31 VITALS
HEART RATE: 74 BPM | HEIGHT: 59 IN | WEIGHT: 135 LBS | RESPIRATION RATE: 16 BRPM | BODY MASS INDEX: 27.21 KG/M2 | TEMPERATURE: 98.1 F | DIASTOLIC BLOOD PRESSURE: 60 MMHG | SYSTOLIC BLOOD PRESSURE: 112 MMHG | OXYGEN SATURATION: 97 %

## 2018-12-31 DIAGNOSIS — J30.2 SEASONAL ALLERGIC RHINITIS, UNSPECIFIED TRIGGER: ICD-10-CM

## 2018-12-31 DIAGNOSIS — R05.9 COUGH: ICD-10-CM

## 2018-12-31 DIAGNOSIS — E66.3 OVERWEIGHT (BMI 25.0-29.9): ICD-10-CM

## 2018-12-31 DIAGNOSIS — R91.1 LUNG NODULE: ICD-10-CM

## 2018-12-31 PROCEDURE — 99214 OFFICE O/P EST MOD 30 MIN: CPT | Performed by: PHYSICIAN ASSISTANT

## 2018-12-31 RX ORDER — METHYLPREDNISOLONE 4 MG/1
TABLET ORAL
Qty: 21 TAB | Refills: 0 | Status: SHIPPED | OUTPATIENT
Start: 2018-12-31 | End: 2019-01-29

## 2018-12-31 ASSESSMENT — PULMONARY FUNCTION TESTS
FEV1/FVC: 85
FEV1/FVC_PERCENT_PREDICTED: 104
FEV1/FVC_PERCENT_CHANGE: 0
FEV1/FVC_PERCENT_PREDICTED: 110
FEV1/FVC: 85
FEV1: 1.6
FEV1/FVC_PERCENT_PREDICTED: 109
FVC_LLN: 2.52
FEV1_PERCENT_PREDICTED: 65
FVC_PERCENT_PREDICTED: 62
FVC: 1.89
FEV1: 1.61
FEV1/FVC: 89
FEV1_PREDICTED: 2.44
FEV1/FVC_PERCENT_PREDICTED: 105
FEV1_LLN: 2.04
FVC: 1.82
FEV1/FVC: 88.46
FEV1_PERCENT_CHANGE: -3
FEV1_PERCENT_CHANGE: 0
FVC_PREDICTED: 3.02
FEV1/FVC_PERCENT_LLN: 68
FEV1/FVC_PERCENT_CHANGE: 4
FEV1_PERCENT_PREDICTED: 66
FEV1/FVC_PERCENT_PREDICTED: 81
FVC_PERCENT_PREDICTED: 60
FEV1/FVC_PREDICTED: 81

## 2018-12-31 NOTE — PROCEDURES
Good patient effort & cooperation.  The results of this test meet the ATS/ERS standards for acceptability and repeatability.  Predicted equations for Spirometry are N-Jaz II, ITS for lung volumes, and [facility] for DLCO.  The DLCO was uncorrected for Hgb.  A bronchodilator of Ventolin HFA- 2puffs via spacer were administered.  DLCO was performed during dilation period.

## 2018-12-31 NOTE — PROCEDURES
Good patient effort & cooperation.  The results of this test meet the ATS/ERS standards for acceptability and repeatability.  Predicted equations for Spirometry are N-Jaz II, ITS for lung volumes, and [facility] for DLCO.  The DLCO was uncorrected for Hgb.  A bronchodilator of Ventolin HFA- 2puffs via spacer were administered.  DLCO was performed during dilation period.    Lung function testing was completed on December 31, 2018.  Mild to moderate restriction is noted with total lung capacity 65% of predicted.  Mid flows and FEV1 are in a similar range.  FEV1/FVC ratio is 104% predicted.  No change after bronchodilators.  Oxygen transfer is normal.  Flow volume loop confirms the restrictive process with good effort noted

## 2019-01-01 NOTE — PROGRESS NOTES
CC: Cough    HPI:  Ameena Hines is a 43 y.o. year old female here today for follow-up on persistent cough times 10 months.  Patient last seen in office on 11/7/2018.  As at last visit language interpretive services for [facility] were utilized.  Both patient and her  speak English as a second language but difficult to communicate details including history and symptomology without using .  Upcoming appointment with internal medicine.    Primary concern is ongoing cough worse at night with clear very stringy/sticky secretions.  Secretions were checked in office with spontaneous cough and thIn and clear at that time.  Did consult with Dr. GAETANO Blue, will schedule follow-up with him.  Reviewed in office vitals including blood pressure of 112/60, heart rate of 74, O2 saturation of 97% on room air, BMI 27.27.      Patient did have a repeat PFT pre-and post spirometry.  Results were compared to previous testing on 9/12/2018 demonstrating per report moderately reduced FVC and FEV1 with reduced FEF 25-75% at 77% predicted reflective of moderate restrictive lung disease, moderately reduced FVC at 63% predicted with reduced inspiratory capacity is 77% predicted confirm moderate restrictive disease.  Results also compared to previous testing completed on 10/15/2018 which per report demonstrated pseudo-restriction secondary to BMI with normal total lung capacity and diffusion capacity.  FVC at that time was 1.84 L or 62% predicted, FEV1 of 1.57 L or 66% predicted.  Test completed today 12/31/2018 demonstrated FVC of 1.89 L or 62% predicted, FEV1 of 1.60 L or 65% predicted, FEF 25-75% at 104% predicted, residual volume at 70% predicted, TLC at 65% predicted, and DLCO at 102% predicted.  Did show some bronchodilator response pulmonologist interpretation pending.     Patient had been recommended for a methacholine challenge which has not been completed at this time.  Patient did have a chest x-ray completed  on 12/28 which showed no acute cardiac or pulmonary abnormalities specifically no infiltrates or consolidation.  Patient was placed on Bactrim for UTI.  Does have a referral to urology.  Patient did have a CT which showed 3 subpleural nodules measuring 3 mm, 4 mm, 5 mm anteriorly in the right middle lobe, probable hepatomegaly.  Consider follow-up CT at 12 months.  Patient also had a CT scan maxillofacial/paranasal sinuses without contrast completed on 9/27/2018 which demonstrated a deviated right nasal septum, mucosal thickening of multiple ethmoid air cells, fluid and mucosal thickening within both sphenoid sinuses, minimal right maxillary sinus thickening, clear left maxillary and both frontal sinuses as well as middle ear and visualized mastoid air cells.     ROS:   Constitutional: complaint of chills, night sweats, ongoing fatigue, denies fever, unintentional weight loss  Skin: Intermittent mild small area rashes improved with Kenalog cream, no nail changes, some hair loss  Eyes: Does not wear glasses, no sudden onset blurring or other vision changes  ENT: Reports caps/crowns on her teeth, some seasonal allergies, history of sinus issues, reports mild hoarseness from coughing, no persistent sore throat, history of ear infections x2, dry nasal passages  GI: Denies heartburn but is on omeprazole, no difficulty swallowing except when needs to clear phlegm, patient reports she does adams a lot of food and use a lot of spices which she may be inhaling as she cooks  Respiratory: Cough with thick sticky clear secretions, denies wheezing, denies shortness of breath at rest, reports some shortness of breath with activity, past history of bronchitis, no history of pneumonia, TB test was negative, no past history of occupational exposure  CV: Mild tachycardia with coughing spells, no history murmurs, chest pain or pressure, edema, orthopnea      Past Medical History:   Diagnosis Date   • Chickenpox    • Hyperlipidemia    •  "Hypertension    • Thyroid disease        Past Surgical History:   Procedure Laterality Date   • LAPAROSCOPY         Family History   Problem Relation Age of Onset   • Hypertension Mother    • Hyperlipidemia Mother        Social History     Social History   • Marital status:      Spouse name: N/A   • Number of children: N/A   • Years of education: N/A     Occupational History   • Not on file.     Social History Main Topics   • Smoking status: Never Smoker   • Smokeless tobacco: Never Used   • Alcohol use No   • Drug use: No   • Sexual activity: Yes     Partners: Male     Other Topics Concern   • Not on file     Social History Narrative   • No narrative on file       Allergies as of 12/31/2018   • (No Known Allergies)        @Vital signs for this encounter:  Vitals:    12/31/18 0850 12/31/18 0855   Height: 1.499 m (4' 11\")    Weight: 61.2 kg (135 lb)    Weight % change since last entry.: 0 %    BP: 112/60    Pulse: 74    BMI (Calculated): 27.27    Resp: 16    Temp: 36.7 °C (98.1 °F)    TempSrc: Oral    O2 sat % room air:  97 %       Current medications as of today   Current Outpatient Prescriptions   Medication Sig Dispense Refill   • MethylPREDNISolone (MEDROL DOSEPAK) 4 MG Tablet Therapy Pack Take as directed. 21 Tab 0   • sulfamethoxazole-trimethoprim (BACTRIM DS) 800-160 MG tablet Take 1 Tab by mouth 2 times a day for 10 days. 20 Tab 0   • potassium chloride (MICRO-K) 10 MEQ capsule Take 1 Cap by mouth every day. 30 Cap 0   • chlorthalidone (HYGROTON) 25 MG Tab TAKE ONE TABLET BY MOUTH ONE TIME DAILY  30 Tab 0   • omeprazole (PRILOSEC) 40 MG delayed-release capsule Take 1 Cap by mouth every day. 30 Cap 2   • budesonide (PULMICORT FLEXHALER) 90 MCG/ACT AEROSOL POWDER, BREATH ACTIVATED Inhale 2 Puffs by mouth 2 Times a Day. 1 Each 6   • albuterol (PROAIR HFA) 108 (90 Base) MCG/ACT Aero Soln inhalation aerosol Inhale 2 Puffs by mouth every 6 hours as needed for Shortness of Breath. 8.5 g 3   • levothyroxine " (SYNTHROID) 50 MCG Tab take 1 tablet by mouth every morning on an empty stomach 90 Tab 0   • loratadine (CLARITIN) 10 MG Tab Take 1 Tab by mouth every day. 60 Tab 2     No current facility-administered medications for this visit.          Physical Exam:   Gen:           Alert and oriented, No apparent distress. Mood and affect appropriate, normal interaction  Eyes:          sclere white, conjunctive moist.  Hearing:     Grossly intact.  Dentition:    Good dentition.  Oropharynx:   Tongue normal, posterior pharynx without erythema or exudate.    Neck:        Supple, trachea midline, no masses.  Respiratory Effort: No intercostal retractions or use of accessory muscles.   Lung Auscultation:      Clear to auscultation bilaterally; no rales, rhonchi or wheezing.  CV:            Regular rate and rhythm. No murmurs, rubs or gallops.  Digits, Nails, Ext: No clubbing, cyanosis, petechiae, or nodes.   Skin:        Mild erythema above left lip margin, no rashes, lesions or ulcers noted on back or    exposed skin surfaces.                     Assessment:  1. Cough   trial course prednisone, guaifenesin, up frequency of albuterol MDI use assess for benefit, continue omeprazole and Pulmicort   2. Seasonal allergic rhinitis, unspecified trigger   not taking Claritin at this time   3. Overweight (BMI 25.0-29.9)   encourage activity as tolerated   4. Lung nodule   follow-up CT 1 year       Immunizations:    Flu: 10/15/2018  Pneumovax 23: Not indicated  Prevnar 13: Not indicated    Plan:  Patient and  expressed some frustration at being unable to resolve her coughing issue.   states his sleep has been disturbed due to all the coughing at night for months and months now.  The cause of symptoms remains unclear.    This dictation was created using voice recognition software. The accuracy of the dictation is limited to the abilities of the software. I expect there may be some errors of grammar and possibly content.

## 2019-01-04 ENCOUNTER — OFFICE VISIT (OUTPATIENT)
Dept: INTERNAL MEDICINE | Facility: MEDICAL CENTER | Age: 44
End: 2019-01-04
Payer: COMMERCIAL

## 2019-01-04 VITALS
WEIGHT: 134 LBS | HEART RATE: 82 BPM | HEIGHT: 59 IN | BODY MASS INDEX: 27.01 KG/M2 | OXYGEN SATURATION: 95 % | DIASTOLIC BLOOD PRESSURE: 68 MMHG | SYSTOLIC BLOOD PRESSURE: 108 MMHG | TEMPERATURE: 98 F

## 2019-01-04 DIAGNOSIS — R05.3 CHRONIC COUGH: ICD-10-CM

## 2019-01-04 DIAGNOSIS — E87.8 ABNORMAL BLOOD ELECTROLYTE LEVEL: ICD-10-CM

## 2019-01-04 DIAGNOSIS — N39.0 RECURRENT UTI: ICD-10-CM

## 2019-01-04 DIAGNOSIS — I10 ESSENTIAL HYPERTENSION: ICD-10-CM

## 2019-01-04 DIAGNOSIS — R71.8 LOW MEAN CORPUSCULAR VOLUME (MCV): ICD-10-CM

## 2019-01-04 PROCEDURE — 99213 OFFICE O/P EST LOW 20 MIN: CPT | Mod: GE | Performed by: INTERNAL MEDICINE

## 2019-01-04 ASSESSMENT — PATIENT HEALTH QUESTIONNAIRE - PHQ9
SUM OF ALL RESPONSES TO PHQ QUESTIONS 1-9: 8
5. POOR APPETITE OR OVEREATING: 1 - SEVERAL DAYS
CLINICAL INTERPRETATION OF PHQ2 SCORE: 1

## 2019-01-04 ASSESSMENT — PAIN SCALES - GENERAL: PAINLEVEL: NO PAIN

## 2019-01-04 NOTE — PATIENT INSTRUCTIONS
Hypertension  Hypertension, commonly called high blood pressure, is when the force of blood pumping through your arteries is too strong. Your arteries are the blood vessels that carry blood from your heart throughout your body. A blood pressure reading consists of a higher number over a lower number, such as 110/72. The higher number (systolic) is the pressure inside your arteries when your heart pumps. The lower number (diastolic) is the pressure inside your arteries when your heart relaxes. Ideally you want your blood pressure below 120/80.  Hypertension forces your heart to work harder to pump blood. Your arteries may become narrow or stiff. Having untreated or uncontrolled hypertension can cause heart attack, stroke, kidney disease, and other problems.  What increases the risk?  Some risk factors for high blood pressure are controllable. Others are not.  Risk factors you cannot control include:  · Race. You may be at higher risk if you are .  · Age. Risk increases with age.  · Gender. Men are at higher risk than women before age 45 years. After age 65, women are at higher risk than men.  Risk factors you can control include:  · Not getting enough exercise or physical activity.  · Being overweight.  · Getting too much fat, sugar, calories, or salt in your diet.  · Drinking too much alcohol.  What are the signs or symptoms?  Hypertension does not usually cause signs or symptoms. Extremely high blood pressure (hypertensive crisis) may cause headache, anxiety, shortness of breath, and nosebleed.  How is this diagnosed?  To check if you have hypertension, your health care provider will measure your blood pressure while you are seated, with your arm held at the level of your heart. It should be measured at least twice using the same arm. Certain conditions can cause a difference in blood pressure between your right and left arms. A blood pressure reading that is higher than normal on one occasion does  not mean that you need treatment. If it is not clear whether you have high blood pressure, you may be asked to return on a different day to have your blood pressure checked again. Or, you may be asked to monitor your blood pressure at home for 1 or more weeks.  How is this treated?  Treating high blood pressure includes making lifestyle changes and possibly taking medicine. Living a healthy lifestyle can help lower high blood pressure. You may need to change some of your habits.  Lifestyle changes may include:  · Following the DASH diet. This diet is high in fruits, vegetables, and whole grains. It is low in salt, red meat, and added sugars.  · Keep your sodium intake below 2,300 mg per day.  · Getting at least 30-45 minutes of aerobic exercise at least 4 times per week.  · Losing weight if necessary.  · Not smoking.  · Limiting alcoholic beverages.  · Learning ways to reduce stress.  Your health care provider may prescribe medicine if lifestyle changes are not enough to get your blood pressure under control, and if one of the following is true:  · You are 18-59 years of age and your systolic blood pressure is above 140.  · You are 60 years of age or older, and your systolic blood pressure is above 150.  · Your diastolic blood pressure is above 90.  · You have diabetes, and your systolic blood pressure is over 140 or your diastolic blood pressure is over 90.  · You have kidney disease and your blood pressure is above 140/90.  · You have heart disease and your blood pressure is above 140/90.  Your personal target blood pressure may vary depending on your medical conditions, your age, and other factors.  Follow these instructions at home:  · Have your blood pressure rechecked as directed by your health care provider.  · Take medicines only as directed by your health care provider. Follow the directions carefully. Blood pressure medicines must be taken as prescribed. The medicine does not work as well when you skip  doses. Skipping doses also puts you at risk for problems.  · Do not smoke.  · Monitor your blood pressure at home as directed by your health care provider.  Contact a health care provider if:  · You think you are having a reaction to medicines taken.  · You have recurrent headaches or feel dizzy.  · You have swelling in your ankles.  · You have trouble with your vision.  Get help right away if:  · You develop a severe headache or confusion.  · You have unusual weakness, numbness, or feel faint.  · You have severe chest or abdominal pain.  · You vomit repeatedly.  · You have trouble breathing.  This information is not intended to replace advice given to you by your health care provider. Make sure you discuss any questions you have with your health care provider.  Document Released: 12/18/2006 Document Revised: 05/25/2017 Document Reviewed: 10/10/2014  [employer] Interactive Patient Education © 2017 [employer]  Iron Deficiency Anemia, Adult  Iron deficiency anemia is a condition in which the concentration of red blood cells or hemoglobin in the blood is below normal because of too little iron. Hemoglobin is a substance in red blood cells that carries oxygen to the body's tissues. When the concentration of red blood cells or hemoglobin is too low, not enough oxygen reaches these tissues.  Iron deficiency anemia is usually long-lasting (chronic) and it develops over time. It may or may not cause symptoms. It is a common type of anemia.  What are the causes?  This condition may be caused by:  · Not enough iron in the diet.  · Blood loss caused by bleeding in the intestine.  · Blood loss from a gastrointestinal condition like Crohn disease.  · Frequent blood draws, such as from blood donation.  · Abnormal absorption in the gut.  · Heavy menstrual periods in women.  · Cancers of the gastrointestinal system, such as colon cancer.  What are the signs or symptoms?  Symptoms of this condition may  include:  · Fatigue.  · Headache.  · Pale skin, lips, and nail beds.  · Poor appetite.  · Weakness.  · Shortness of breath.  · Dizziness.  · Cold hands and feet.  · Fast or irregular heartbeat.  · Irritability. This is more common in severe anemia.  · Rapid breathing. This is more common in severe anemia.  Mild anemia may not cause any symptoms.  How is this diagnosed?  This condition is diagnosed based on:  · Your medical history.  · A physical exam.  · Blood tests.  You may have additional tests to find the underlying cause of your anemia, such as:  · Testing for blood in the stool (fecal occult blood test).  · A procedure to see inside your colon and rectum (colonoscopy).  · A procedure to see inside your esophagus and stomach (endoscopy).  · A test in which cells are removed from bone marrow (bone marrow aspiration) or fluid is removed from the bone marrow to be examined (biopsy). This is rarely needed.  How is this treated?  This condition is treated by correcting the cause of your iron deficiency. Treatment may involve:  · Adding iron-rich foods to your diet.  · Taking iron supplements. If you are pregnant or breastfeeding, you may need to take extra iron because your normal diet usually does not provide the amount of iron that you need.  · Increasing vitamin C intake. Vitamin C helps your body absorb iron. Your health care provider may recommend that you take iron supplements along with a glass of orange juice or a vitamin C supplement.  · Medicines to make heavy menstrual flow lighter.  · Surgery.  You may need repeat blood tests to determine whether treatment is working. Depending on the underlying cause, the anemia should be corrected within 2 months of starting treatment. If the treatment does not seem to be working, you may need more testing.  Follow these instructions at home:  Medicines  · Take over-the-counter and prescription medicines only as told by your health care provider. This includes iron  supplements and vitamins.  · If you cannot tolerate taking iron supplements by mouth, talk with your health care provider about taking them through a vein (intravenously) or an injection into a muscle.  · For the best iron absorption, you should take iron supplements when your stomach is empty. If you cannot tolerate them on an empty stomach, you may need to take them with food.  · Do not drink milk or take antacids at the same time as your iron supplements. Milk and antacids may interfere with iron absorption.  · Iron supplements can cause constipation. To prevent constipation, include fiber in your diet as told by your health care provider. A stool softener may also be recommended.  Eating and drinking  · Talk with your health care provider before changing your diet. He or she may recommend that you eat foods that contain a lot of iron, such as:  ¨ Liver.  ¨ Low-fat (lean) beef.  ¨ Breads and cereals that have iron added to them (are fortified).  ¨ Eggs.  ¨ Dried fruit.  ¨ Dark green, leafy vegetables.  · To help your body use the iron from iron-rich foods, eat those foods at the same time as fresh fruits and vegetables that are high in vitamin C. Foods that are high in vitamin C include:  ¨ Oranges.  ¨ Peppers.  ¨ Tomatoes.  ¨ Mangoes.  · Drink enough fluid to keep your urine clear or pale yellow.  General instructions  · Return to your normal activities as told by your health care provider. Ask your health care provider what activities are safe for you.  · Practice good hygiene. Anemia can make you more prone to illness and infection.  · Keep all follow-up visits as told by your health care provider. This is important.  Contact a health care provider if:  · You feel nauseous or you vomit.  · You feel weak.  · You have unexplained sweating.  · You develop symptoms of constipation, such as:  ¨ Having fewer than three bowel movements a week.  ¨ Straining to have a bowel movement.  ¨ Having stools that are hard,  dry, or larger than normal.  ¨ Feeling full or bloated.  ¨ Pain in the lower abdomen.  ¨ Not feeling relief after having a bowel movement.  Get help right away if:  · You faint. If this happens, do not drive yourself to the hospital. Call your local emergency services (911 in the [place]  · You have chest pain.  · You have shortness of breath that:  ¨ Is severe.  ¨ Gets worse with physical activity.  · You have a rapid heartbeat.  · You become light-headed when getting up from a sitting or lying down position.  This information is not intended to replace advice given to you by your health care provider. Make sure you discuss any questions you have with your health care provider.  Document Released: 12/15/2001 Document Revised: 09/06/2017 Document Reviewed: 09/06/2017  [employer] Interactive Patient Education © 2017 [employer]  Hypokalemia  Hypokalemia means that the amount of potassium in the blood is lower than normal. Potassium is a chemical that helps regulate the amount of fluid in the body (electrolyte). It also stimulates muscle tightening (contraction) and helps nerves work properly. Normally, most of the body’s potassium is inside of cells, and only a very small amount is in the blood. Because the amount in the blood is so small, minor changes to potassium levels in the blood can be life-threatening.  What are the causes?  This condition may be caused by:  · Antibiotic medicine.  · Diarrhea or vomiting. Taking too much of a medicine that helps you have a bowel movement (laxative) can cause diarrhea and lead to hypokalemia.  · Chronic kidney disease (CKD).  · Medicines that help the body get rid of excess fluid (diuretics).  · Eating disorders, such as bulimia.  · Low magnesium levels in the body.  · Sweating a lot.  What are the signs or symptoms?  Symptoms of this condition include:  · Weakness.  · Constipation.  · Fatigue.  · Muscle cramps.  · Mental confusion.  · Skipped heartbeats or irregular heartbeat  (palpitations).  · Tingling or numbness.  How is this diagnosed?  This condition is diagnosed with a blood test.  How is this treated?  Hypokalemia can be treated by taking potassium supplements by mouth or adjusting the medicines that you take. Treatment may also include eating more foods that contain a lot of potassium. If your potassium level is very low, you may need to get potassium through an IV tube in one of your veins and be monitored in the hospital.  Follow these instructions at home:  · Take over-the-counter and prescription medicines only as told by your health care provider. This includes vitamins and supplements.  · Eat a healthy diet. A healthy diet includes fresh fruits and vegetables, whole grains, healthy fats, and lean proteins.  · If instructed, eat more foods that contain a lot of potassium, such as:  ¨ Nuts, such as peanuts and pistachios.  ¨ Seeds, such as sunflower seeds and pumpkin seeds.  ¨ Peas, lentils, and lima beans.  ¨ Whole grain and bran cereals and breads.  ¨ Fresh fruits and vegetables, such as apricots, avocado, bananas, cantaloupe, kiwi, oranges, tomatoes, asparagus, and potatoes.  ¨ Orange juice.  ¨ Tomato juice.  ¨ Red meats.  ¨ Yogurt.  · Keep all follow-up visits as told by your health care provider. This is important.  Contact a health care provider if:  · You have weakness that gets worse.  · You feel your heart pounding or racing.  · You vomit.  · You have diarrhea.  · You have diabetes (diabetes mellitus) and you have trouble keeping your blood sugar (glucose) in your target range.  Get help right away if:  · You have chest pain.  · You have shortness of breath.  · You have vomiting or diarrhea that lasts for more than 2 days.  · You faint.  This information is not intended to replace advice given to you by your health care provider. Make sure you discuss any questions you have with your health care provider.  Document Released: 12/18/2006 Document Revised: 08/05/2017  Document Reviewed: 08/05/2017  [employer] Interactive Patient Education © 2017 [employer]  ut  Urinary Tract Infection, Adult  A urinary tract infection (UTI) is an infection of any part of the urinary tract, which includes the kidneys, ureters, bladder, and urethra. These organs make, store, and get rid of urine in the body. UTI can be a bladder infection (cystitis) or kidney infection (pyelonephritis).  What are the causes?  This infection may be caused by fungi, viruses, or bacteria. Bacteria are the most common cause of UTIs. This condition can also be caused by repeated incomplete emptying of the bladder during urination.  What increases the risk?  This condition is more likely to develop if:  · You ignore your need to urinate or hold urine for long periods of time.  · You do not empty your bladder completely during urination.  · You wipe back to front after urinating or having a bowel movement, if you are female.  · You are uncircumcised, if you are male.  · You are constipated.  · You have a urinary catheter that stays in place (indwelling).  · You have a weak defense (immune) system.  · You have a medical condition that affects your bowels, kidneys, or bladder.  · You have diabetes.  · You take antibiotic medicines frequently or for long periods of time, and the antibiotics no longer work well against certain types of infections (antibiotic resistance).  · You take medicines that irritate your urinary tract.  · You are exposed to chemicals that irritate your urinary tract.  · You are female.  What are the signs or symptoms?  Symptoms of this condition include:  · Fever.  · Frequent urination or passing small amounts of urine frequently.  · Needing to urinate urgently.  · Pain or burning with urination.  · Urine that smells bad or unusual.  · Cloudy urine.  · Pain in the lower abdomen or back.  · Trouble urinating.  · Blood in the urine.  · Vomiting or being less hungry than normal.  · Diarrhea or abdominal  pain.  · Vaginal discharge, if you are female.  How is this diagnosed?  This condition is diagnosed with a medical history and physical exam. You will also need to provide a urine sample to test your urine. Other tests may be done, including:  · Blood tests.  · Sexually transmitted disease (STD) testing.  If you have had more than one UTI, a cystoscopy or imaging studies may be done to determine the cause of the infections.  How is this treated?  Treatment for this condition often includes a combination of two or more of the following:  · Antibiotic medicine.  · Other medicines to treat less common causes of UTI.  · Over-the-counter medicines to treat pain.  · Drinking enough water to stay hydrated.  Follow these instructions at home:  · Take over-the-counter and prescription medicines only as told by your health care provider.  · If you were prescribed an antibiotic, take it as told by your health care provider. Do not stop taking the antibiotic even if you start to feel better.  · Avoid alcohol, caffeine, tea, and carbonated beverages. They can irritate your bladder.  · Drink enough fluid to keep your urine clear or pale yellow.  · Keep all follow-up visits as told by your health care provider. This is important.  · Make sure to:  ¨ Empty your bladder often and completely. Do not hold urine for long periods of time.  ¨ Empty your bladder before and after sex.  ¨ Wipe from front to back after a bowel movement if you are female. Use each tissue one time when you wipe.  Contact a health care provider if:  · You have back pain.  · You have a fever.  · You feel nauseous or vomit.  · Your symptoms do not get better after 3 days.  · Your symptoms go away and then return.  Get help right away if:  · You have severe back pain or lower abdominal pain.  · You are vomiting and cannot keep down any medicines or water.  This information is not intended to replace advice given to you by your health care provider. Make sure you  discuss any questions you have with your health care provider.  Document Released: 09/27/2006 Document Revised: 05/31/2017 Document Reviewed: 11/07/2016  [employer] Interactive Patient Education © 2017 [employer]

## 2019-01-07 DIAGNOSIS — R05.9 COUGH IN ADULT: ICD-10-CM

## 2019-01-07 DIAGNOSIS — I10 ESSENTIAL HYPERTENSION: ICD-10-CM

## 2019-01-07 RX ORDER — LEVOTHYROXINE SODIUM 0.05 MG/1
TABLET ORAL
Qty: 30 TAB | Refills: 1 | Status: SHIPPED | OUTPATIENT
Start: 2019-01-07 | End: 2019-03-07 | Stop reason: SDUPTHER

## 2019-01-07 RX ORDER — CHLORTHALIDONE 25 MG/1
TABLET ORAL
Qty: 30 TAB | Refills: 0 | Status: SHIPPED | OUTPATIENT
Start: 2019-01-07 | End: 2019-02-04 | Stop reason: SDUPTHER

## 2019-01-07 NOTE — PROGRESS NOTES
Established Patient    Ameena presents today with the following:    CC: review labs    HPI: Patient is a 43-year-old female with PMHx of HTN,hypothyroidism comes here to follow-up to review labs.     # Cough: Patient has several months of unresolving cough and improving.Following up with Pulmonology and had PFT's done.Using pulmicort and albuterol. As per recent pulmonology office visit- recommended trial of prednisone and increased frequency of albuterol use.     # HTN: Diagnosed many years back and was on cardizem.Switched to lisinopril and dose was increased to 20 mg for better control of blood pressure. She began to have cough and lisinopril was switched to losartan and later to chlorthalidone. BP has been controlled and stable with current dose of chlorthalidone.Recent lab work showed abnormal electrolytes.    # Low MCV : Repeat CBC showed low MCV.Denies related symptoms. Denies any active source of bleeding issues going on.      Patient Active Problem List    Diagnosis Date Noted   • Cough 12/31/2018   • Lung nodule 10/11/2018   • Restrictive lung disease 09/14/2018   • Allergic rhinitis 04/16/2018   • Overweight (BMI 25.0-29.9) 04/02/2018   • Hypertension 03/22/2018   • Hypothyroidism 03/22/2018   • Hyperlipidemia 03/22/2018   • Hot flashes due to menopause 03/22/2018       Current Outpatient Prescriptions   Medication Sig Dispense Refill   • MethylPREDNISolone (MEDROL DOSEPAK) 4 MG Tablet Therapy Pack Take as directed. 21 Tab 0   • sulfamethoxazole-trimethoprim (BACTRIM DS) 800-160 MG tablet Take 1 Tab by mouth 2 times a day for 10 days. 20 Tab 0   • potassium chloride (MICRO-K) 10 MEQ capsule Take 1 Cap by mouth every day. 30 Cap 0   • chlorthalidone (HYGROTON) 25 MG Tab TAKE ONE TABLET BY MOUTH ONE TIME DAILY  30 Tab 0   • omeprazole (PRILOSEC) 40 MG delayed-release capsule Take 1 Cap by mouth every day. 30 Cap 2   • budesonide (PULMICORT FLEXHALER) 90 MCG/ACT AEROSOL POWDER, BREATH ACTIVATED Inhale 2  "Puffs by mouth 2 Times a Day. 1 Each 6   • albuterol (PROAIR HFA) 108 (90 Base) MCG/ACT Aero Soln inhalation aerosol Inhale 2 Puffs by mouth every 6 hours as needed for Shortness of Breath. 8.5 g 3   • levothyroxine (SYNTHROID) 50 MCG Tab take 1 tablet by mouth every morning on an empty stomach 90 Tab 0     No current facility-administered medications for this visit.        Social History     Social History   • Marital status:      Spouse name: N/A   • Number of children: N/A   • Years of education: N/A     Occupational History   • Not on file.     Social History Main Topics   • Smoking status: Never Smoker   • Smokeless tobacco: Never Used   • Alcohol use No   • Drug use: No   • Sexual activity: Yes     Partners: Male     Other Topics Concern   • Not on file     Social History Narrative   • No narrative on file       Family History   Problem Relation Age of Onset   • Hypertension Mother    • Hyperlipidemia Mother        ROS: As per HPI. Additional pertinent symptoms as noted below.    Constitutional: Denies fever/chills/weight changes.   Eyes: Denies changes/pain in vision  ENT: Denies sore throat/congestion/ear ache.   Cardiovascular: Denies chest pain /palpitations/edema.   Respiratory: Denies SOB/cough/PND/orthopnea  Abdomen: Denies difficulty swallowing/ diarrhea/constipation/abdominal pain/nausea/vomiting  Genitourinary: Normal urinary habits.   Musculo-skeletal: normal ambulation.Denies muscle or joint pains.  Skin: Denies rash/lesions.  Neurological: Denies weakness/tingling/numbness/headache  Psychological: good mood and cooperative. Denies anxiety /depression       /68 (BP Location: Right arm, Patient Position: Sitting, BP Cuff Size: Adult)   Pulse 82   Temp 36.7 °C (98 °F) (Temporal)   Ht 1.499 m (4' 11\")   Wt 60.8 kg (134 lb)   SpO2 95%   BMI 27.06 kg/m²     Physical Exam  General:  Alert and oriented, No apparent distress.    Eyes: Pupils equal and reactive. No scleral " icterus.    Throat: Clear no erythema or exudates noted.    Neck: Supple. No lymphadenopathy noted. Thyroid not enlarged.    Lungs: Clear to auscultation and percussion bilaterally.    Cardiovascular: Regular rate and rhythm. No murmurs, rubs or gallops.    Abdomen:  Benign. No rebound or guarding noted.    Extremities: No clubbing, cyanosis, edema.    Skin: Clear. No rash or suspicious skin lesions noted.    Neurological: Oriented to time, place, and person .Cranial nerves intact. No motor/sensory deficits.Reflexes were normal and symmetrical in both upper and lower extremities     Musculoskeletal : NROM of all extremities. No tenderness or deformity noted.     Note: I have reviewed all pertinent labs and diagnostic tests associated with this visit with specific comments listed under the assessment and plan below      Assessment and Plan    1. Essential hypertension  2. Abnormal blood electrolyte level  - today BP is 108/68 and denies any related symptoms.  - currently on chlorthalidone with good control  - recent lab work to monitor K+ showed borderline low potassium and advised to take potassium supplementation as needed alternate days. Repeat BMP for next visit  - will continue to follow up     3. Low mean corpuscular volume (MCV)  Noted in repeat labs  Denies related symptoms  Hb/Hct stable and within normal range  Ordered iron studies after reviewing labs but patient did not do the labs so reprinted and handed to patient today.  - ordered FIT with repeat CBC    4. Chronic cough  - ongoing cough for the several months which is improving  - following up with pulmonology  - currently on steroid use, albuterol, pulmicort.    5. Recurrent UTI  - recent ED visit  - currently on bactrim and ED gave referral to urology given recurrent UTI's      Signed by: Mani Ball M.D.

## 2019-01-07 NOTE — TELEPHONE ENCOUNTER
Patient Seen: 01/04/19 With Dr. Ball  Next Appointment: 02/15/19 With Dr. Ball  Was the patient seen in the last year in this department? Yes     Does patient have an active prescription for medications requested? No     Received Request Via: Pharmacy

## 2019-01-10 ENCOUNTER — HOSPITAL ENCOUNTER (OUTPATIENT)
Dept: LAB | Facility: MEDICAL CENTER | Age: 44
End: 2019-01-10
Attending: INTERNAL MEDICINE
Payer: COMMERCIAL

## 2019-01-10 DIAGNOSIS — E87.8 ABNORMAL BLOOD ELECTROLYTE LEVEL: ICD-10-CM

## 2019-01-10 DIAGNOSIS — R71.8 LOW MEAN CORPUSCULAR VOLUME (MCV): ICD-10-CM

## 2019-01-10 DIAGNOSIS — I10 ESSENTIAL HYPERTENSION: ICD-10-CM

## 2019-01-10 LAB
ANION GAP SERPL CALC-SCNC: 9 MMOL/L (ref 0–11.9)
BASOPHILS # BLD AUTO: 0.8 % (ref 0–1.8)
BASOPHILS # BLD: 0.05 K/UL (ref 0–0.12)
BUN SERPL-MCNC: 11 MG/DL (ref 8–22)
CALCIUM SERPL-MCNC: 9.5 MG/DL (ref 8.5–10.5)
CHLORIDE SERPL-SCNC: 93 MMOL/L (ref 96–112)
CO2 SERPL-SCNC: 29 MMOL/L (ref 20–33)
CREAT SERPL-MCNC: 0.82 MG/DL (ref 0.5–1.4)
EOSINOPHIL # BLD AUTO: 0.51 K/UL (ref 0–0.51)
EOSINOPHIL NFR BLD: 8 % (ref 0–6.9)
ERYTHROCYTE [DISTWIDTH] IN BLOOD BY AUTOMATED COUNT: 34.5 FL (ref 35.9–50)
FERRITIN SERPL-MCNC: 118.9 NG/ML (ref 10–291)
GLUCOSE SERPL-MCNC: 88 MG/DL (ref 65–99)
HCT VFR BLD AUTO: 39.2 % (ref 37–47)
HGB BLD-MCNC: 13.3 G/DL (ref 12–16)
IMM GRANULOCYTES # BLD AUTO: 0.02 K/UL (ref 0–0.11)
IMM GRANULOCYTES NFR BLD AUTO: 0.3 % (ref 0–0.9)
IRON SATN MFR SERPL: 27 % (ref 15–55)
IRON SERPL-MCNC: 109 UG/DL (ref 40–170)
LYMPHOCYTES # BLD AUTO: 1.85 K/UL (ref 1–4.8)
LYMPHOCYTES NFR BLD: 29 % (ref 22–41)
MCH RBC QN AUTO: 25.1 PG (ref 27–33)
MCHC RBC AUTO-ENTMCNC: 33.9 G/DL (ref 33.6–35)
MCV RBC AUTO: 74 FL (ref 81.4–97.8)
MONOCYTES # BLD AUTO: 0.5 K/UL (ref 0–0.85)
MONOCYTES NFR BLD AUTO: 7.8 % (ref 0–13.4)
NEUTROPHILS # BLD AUTO: 3.46 K/UL (ref 2–7.15)
NEUTROPHILS NFR BLD: 54.1 % (ref 44–72)
NRBC # BLD AUTO: 0 K/UL
NRBC BLD-RTO: 0 /100 WBC
PLATELET # BLD AUTO: 312 K/UL (ref 164–446)
PMV BLD AUTO: 11.1 FL (ref 9–12.9)
POTASSIUM SERPL-SCNC: 3.5 MMOL/L (ref 3.6–5.5)
RBC # BLD AUTO: 5.3 M/UL (ref 4.2–5.4)
SODIUM SERPL-SCNC: 131 MMOL/L (ref 135–145)
TIBC SERPL-MCNC: 409 UG/DL (ref 250–450)
TRANSFERRIN SERPL-MCNC: 276 MG/DL (ref 200–370)
WBC # BLD AUTO: 6.4 K/UL (ref 4.8–10.8)

## 2019-01-10 PROCEDURE — 82728 ASSAY OF FERRITIN: CPT

## 2019-01-10 PROCEDURE — 85025 COMPLETE CBC W/AUTO DIFF WBC: CPT

## 2019-01-10 PROCEDURE — 84466 ASSAY OF TRANSFERRIN: CPT

## 2019-01-10 PROCEDURE — 36415 COLL VENOUS BLD VENIPUNCTURE: CPT

## 2019-01-10 PROCEDURE — 80048 BASIC METABOLIC PNL TOTAL CA: CPT

## 2019-01-10 PROCEDURE — 83550 IRON BINDING TEST: CPT

## 2019-01-10 PROCEDURE — 83540 ASSAY OF IRON: CPT

## 2019-01-11 ENCOUNTER — HOSPITAL ENCOUNTER (OUTPATIENT)
Facility: MEDICAL CENTER | Age: 44
End: 2019-01-11
Attending: INTERNAL MEDICINE
Payer: COMMERCIAL

## 2019-01-11 PROCEDURE — 82274 ASSAY TEST FOR BLOOD FECAL: CPT

## 2019-01-13 DIAGNOSIS — R71.8 LOW MEAN CORPUSCULAR VOLUME (MCV): ICD-10-CM

## 2019-01-13 LAB — HEMOCCULT STL QL IA: NEGATIVE

## 2019-01-23 ENCOUNTER — HOSPITAL ENCOUNTER (OUTPATIENT)
Dept: RADIOLOGY | Facility: MEDICAL CENTER | Age: 44
End: 2019-01-23
Attending: PHYSICIAN ASSISTANT
Payer: COMMERCIAL

## 2019-01-23 DIAGNOSIS — R91.8 PULMONARY NODULES: ICD-10-CM

## 2019-01-23 PROCEDURE — 71250 CT THORAX DX C-: CPT

## 2019-01-29 ENCOUNTER — OFFICE VISIT (OUTPATIENT)
Dept: URGENT CARE | Facility: CLINIC | Age: 44
End: 2019-01-29
Payer: COMMERCIAL

## 2019-01-29 ENCOUNTER — APPOINTMENT (OUTPATIENT)
Dept: RADIOLOGY | Facility: IMAGING CENTER | Age: 44
End: 2019-01-29
Attending: PHYSICIAN ASSISTANT
Payer: COMMERCIAL

## 2019-01-29 VITALS
OXYGEN SATURATION: 98 % | TEMPERATURE: 98.1 F | BODY MASS INDEX: 27.01 KG/M2 | RESPIRATION RATE: 16 BRPM | WEIGHT: 134 LBS | DIASTOLIC BLOOD PRESSURE: 82 MMHG | HEIGHT: 59 IN | HEART RATE: 71 BPM | SYSTOLIC BLOOD PRESSURE: 126 MMHG

## 2019-01-29 DIAGNOSIS — Z87.09 HISTORY OF RESTRICTIVE LUNG DISEASE: ICD-10-CM

## 2019-01-29 DIAGNOSIS — R06.02 SOB (SHORTNESS OF BREATH): ICD-10-CM

## 2019-01-29 DIAGNOSIS — R20.0 NUMBNESS OF TOES: ICD-10-CM

## 2019-01-29 DIAGNOSIS — R05.3 CHRONIC COUGH: Primary | ICD-10-CM

## 2019-01-29 PROCEDURE — 71046 X-RAY EXAM CHEST 2 VIEWS: CPT | Mod: TC | Performed by: PHYSICIAN ASSISTANT

## 2019-01-29 PROCEDURE — 99214 OFFICE O/P EST MOD 30 MIN: CPT | Performed by: PHYSICIAN ASSISTANT

## 2019-01-29 RX ORDER — PREDNISONE 20 MG/1
TABLET ORAL
Qty: 23 TAB | Refills: 0 | Status: SHIPPED | OUTPATIENT
Start: 2019-01-29 | End: 2019-02-15

## 2019-01-29 RX ORDER — IPRATROPIUM BROMIDE AND ALBUTEROL SULFATE 2.5; .5 MG/3ML; MG/3ML
3 SOLUTION RESPIRATORY (INHALATION) ONCE
Status: COMPLETED | OUTPATIENT
Start: 2019-01-29 | End: 2019-01-29

## 2019-01-29 RX ADMIN — IPRATROPIUM BROMIDE AND ALBUTEROL SULFATE 3 ML: 2.5; .5 SOLUTION RESPIRATORY (INHALATION) at 19:27

## 2019-01-30 NOTE — PROGRESS NOTES
Subjective:      Pt is a 44 y.o. female who presents with Otalgia (x2 days); Pressure Behind the Eyes (x2 days); Shortness of Breath (x2 days, makes her neck and back hurt); and Numbness (x1 month, in her left middle toe)            HPI  This is a chronic problem with her lungs. This is a new problem with her toe. Pt notes one month of left middle toe numbness without known injury or trauma or inciting event. Pt denies hx of diabetes. Pt notes hx of restrictive lung disease and is having a flare up with SOB, pressure behind eyes and body aches x 2 days but coughing x 1-2 years daily worsening in the last 2 days.  Pt has not taken any Rx medications for this condition. Pt states the pain is a 7/10 with chronic cough, aching in nature and worse at night. Pt denies CP, SOB, NVD,  headaches, dizziness, change in vision, hives, or other joint pain. The pt's medication list, problem list, and allergies have been evaluated and reviewed during today's visit.    PMH:  Past Medical History:   Diagnosis Date   • Chickenpox    • Hyperlipidemia    • Hypertension    • Thyroid disease        PSH:  Past Surgical History:   Procedure Laterality Date   • LAPAROSCOPY         Fam Hx:    family history includes Hyperlipidemia in her mother; Hypertension in her mother.  Family Status   Relation Status   • Mo Alive   • Fa Alive   • Sis Alive   • Bro Alive   • MGMo    • MGFa    • PGMo    • PGFa        Soc HX:  Social History     Social History   • Marital status:      Spouse name: N/A   • Number of children: N/A   • Years of education: N/A     Occupational History   • Not on file.     Social History Main Topics   • Smoking status: Never Smoker   • Smokeless tobacco: Never Used   • Alcohol use No   • Drug use: No   • Sexual activity: Yes     Partners: Male     Other Topics Concern   • Not on file     Social History Narrative   • No narrative on file         Medications:    Current Outpatient  Prescriptions:   •  predniSONE (DELTASONE) 20 MG Tab, Take 3 tabs at once PO daily x 5 days, then take 2 tabs at once daily x 3 days, then take 1 tab PO daily x 2 days, Disp: 23 Tab, Rfl: 0  •  levothyroxine (SYNTHROID) 50 MCG Tab, take 1 tablet by mouth every morning on an empty stomach, Disp: 30 Tab, Rfl: 1  •  chlorthalidone (HYGROTON) 25 MG Tab, take 1 tablet by mouth once daily, Disp: 30 Tab, Rfl: 0  •  potassium chloride (MICRO-K) 10 MEQ capsule, Take 1 Cap by mouth every day., Disp: 30 Cap, Rfl: 0  •  omeprazole (PRILOSEC) 40 MG delayed-release capsule, Take 1 Cap by mouth every day., Disp: 30 Cap, Rfl: 2  •  budesonide (PULMICORT FLEXHALER) 90 MCG/ACT AEROSOL POWDER, BREATH ACTIVATED, Inhale 2 Puffs by mouth 2 Times a Day., Disp: 1 Each, Rfl: 6  •  albuterol (PROAIR HFA) 108 (90 Base) MCG/ACT Aero Soln inhalation aerosol, Inhale 2 Puffs by mouth every 6 hours as needed for Shortness of Breath., Disp: 8.5 g, Rfl: 3      Allergies:  Eggplant    ROS  Review of Systems   Constitutional: Positive for malaise/fatigue. Negative for fever and diaphoresis.   HENT: Positive for congestion. Negative for ear discharge, hearing loss, nosebleeds and tinnitus.    Eyes: Negative for blurred vision, double vision and photophobia.   Respiratory: Positive for cough, sputum production, shortness of breath and wheezing. Negative for hemoptysis.    Cardiovascular: Negative for chest pain and palpitations.   Gastrointestinal: Negative for nausea, vomiting, abdominal pain, diarrhea and constipation.   Genitourinary: Negative for dysuria and flank pain.   Musculoskeletal: Negative for joint pain and myalgias.   Skin: Negative for itching and rash.   Neurological:  +left  middle toe numbness  Endo/Heme/Allergies: Does not bruise/bleed easily.   Psychiatric/Behavioral: Negative for depression. The patient is not nervous/anxious.             Objective:     /82   Pulse 71   Temp 36.7 °C (98.1 °F)   Resp 16   Ht 1.499 m (4'  "11\")   Wt 60.8 kg (134 lb)   SpO2 98%   BMI 27.06 kg/m²      Physical Exam      Physical Exam   Constitutional: PT is oriented to person, place, and time. PT appears well-developed and well-nourished. No distress.   HENT:   Head: Normocephalic and atraumatic.   Right Ear: Hearing, tympanic membrane, external ear and ear canal normal.   Left Ear: Hearing, tympanic membrane, external ear and ear canal normal.   Nose: Mucosal edema, rhinorrhea and sinus tenderness present. Right sinus exhibits frontal sinus tenderness. Left sinus exhibits frontal sinus tenderness.   Mouth/Throat: Uvula is midline. Mucous membranes are pale. Posterior oropharyngeal edema and posterior oropharyngeal erythema present. No oropharyngeal exudate.   Eyes: Conjunctivae normal and EOM are normal. Pupils are equal, round, and reactive to light. Right eye exhibits no discharge. Left eye exhibits no discharge.   Neck: Normal range of motion. Neck supple. No thyromegaly present.   Cardiovascular: Normal rate, regular rhythm, normal heart sounds and intact distal pulses.  Exam reveals no gallop and no friction rub.    No murmur heard.  Pulmonary/Chest: Effort normal. No respiratory distress. PT has wheezes. PT has no rales. PT exhibits tenderness.   Abdominal: Soft. Bowel sounds are normal. PT exhibits no distension and no mass. There is no tenderness. There is no rebound and no guarding.   Musculoskeletal: Normal range of motion. PT exhibits no edema and no tenderness.   Lymphadenopathy:     PT has no cervical adenopathy.   Neurological: Pt is alert and oriented to person, place, and time. Pt has normal reflexes. +left middle toe with decreased SLT  Skin: Skin is warm and dry. No rash noted. No erythema.   Psychiatric: PT has a normal mood and affect. Pt behavior is normal. Judgment and thought content normal.     RADS:  Narrative       1/29/2019 7:12 PM    HISTORY/REASON FOR EXAM:  Shortness of breath for 2 days.    TECHNIQUE/EXAM DESCRIPTION " AND NUMBER OF VIEWS:  Two views of the chest.    COMPARISON: None.    FINDINGS:  There is no evidence of focal consolidation or evidence of pulmonary edema.  The heart is normal in size.  There is no evidence of pleural effusion.  Soft tissues and bony structures are unremarkable.     Impression       No evidence of acute cardiopulmonary process.   Reading Provider Reading Date   Dm Dunbar M.D. Jan 29, 2019          Assessment/Plan:     1. Chronic cough    - predniSONE (DELTASONE) 20 MG Tab; Take 3 tabs at once PO daily x 5 days, then take 2 tabs at once daily x 3 days, then take 1 tab PO daily x 2 days  Dispense: 23 Tab; Refill: 0    2. SOB (shortness of breath)    - DX-CHEST-2 VIEWS; Future  - ipratropium-albuterol (DUONEB) nebulizer solution; 3 mL by Nebulization route Once.  - predniSONE (DELTASONE) 20 MG Tab; Take 3 tabs at once PO daily x 5 days, then take 2 tabs at once daily x 3 days, then take 1 tab PO daily x 2 days  Dispense: 23 Tab; Refill: 0    3. History of restrictive lung disease    - predniSONE (DELTASONE) 20 MG Tab; Take 3 tabs at once PO daily x 5 days, then take 2 tabs at once daily x 3 days, then take 1 tab PO daily x 2 days  Dispense: 23 Tab; Refill: 0    4. Numbness of toes    - REFERRAL TO SPORTS MEDICINE    Follow up with PULM and PCP  STRICT ER precautions given  Rest, fluids encouraged.  OTC decongestant for congestion/cough  AVS with medical info given.  Pt was in full understanding and agreement with the plan.  Follow-up as needed if symptoms worsen or fail to improve.

## 2019-02-04 DIAGNOSIS — R05.9 COUGH IN ADULT: ICD-10-CM

## 2019-02-04 DIAGNOSIS — I10 ESSENTIAL HYPERTENSION: ICD-10-CM

## 2019-02-04 DIAGNOSIS — R05.9 COUGH: ICD-10-CM

## 2019-02-04 RX ORDER — CHLORTHALIDONE 25 MG/1
TABLET ORAL
Qty: 30 TAB | Refills: 0 | Status: SHIPPED | OUTPATIENT
Start: 2019-02-04 | End: 2019-03-07 | Stop reason: SDUPTHER

## 2019-02-04 RX ORDER — OMEPRAZOLE 40 MG/1
40 CAPSULE, DELAYED RELEASE ORAL DAILY
Qty: 30 CAP | Refills: 4 | Status: SHIPPED | OUTPATIENT
Start: 2019-02-04 | End: 2019-03-06

## 2019-02-04 NOTE — TELEPHONE ENCOUNTER
Have we ever prescribed this med? Yes.  If yes, what date? 11/07/2018    Last OV: 12/31/2018 - Ilana Newsome    Next OV: 02/21/22019 - Dr. Blue    DX: Cough    Medications: Prilosec

## 2019-02-13 ENCOUNTER — HOSPITAL ENCOUNTER (OUTPATIENT)
Dept: RADIOLOGY | Facility: MEDICAL CENTER | Age: 44
End: 2019-02-13
Attending: UROLOGY
Payer: COMMERCIAL

## 2019-02-13 DIAGNOSIS — R31.0 GROSS HEMATURIA: ICD-10-CM

## 2019-02-13 PROCEDURE — 74178 CT ABD&PLV WO CNTR FLWD CNTR: CPT

## 2019-02-13 PROCEDURE — 700117 HCHG RX CONTRAST REV CODE 255: Performed by: UROLOGY

## 2019-02-13 RX ADMIN — IOHEXOL 100 ML: 350 INJECTION, SOLUTION INTRAVENOUS at 08:04

## 2019-02-15 ENCOUNTER — OFFICE VISIT (OUTPATIENT)
Dept: INTERNAL MEDICINE | Facility: MEDICAL CENTER | Age: 44
End: 2019-02-15
Payer: COMMERCIAL

## 2019-02-15 VITALS
WEIGHT: 136.25 LBS | SYSTOLIC BLOOD PRESSURE: 116 MMHG | TEMPERATURE: 97.4 F | HEIGHT: 59 IN | DIASTOLIC BLOOD PRESSURE: 74 MMHG | OXYGEN SATURATION: 99 % | HEART RATE: 83 BPM | BODY MASS INDEX: 27.47 KG/M2

## 2019-02-15 DIAGNOSIS — E66.3 OVERWEIGHT (BMI 25.0-29.9): ICD-10-CM

## 2019-02-15 DIAGNOSIS — R91.1 LUNG NODULE: ICD-10-CM

## 2019-02-15 DIAGNOSIS — I10 ESSENTIAL HYPERTENSION: ICD-10-CM

## 2019-02-15 DIAGNOSIS — Z87.440 HISTORY OF RECURRENT UTIS: ICD-10-CM

## 2019-02-15 DIAGNOSIS — E03.9 HYPOTHYROIDISM, UNSPECIFIED TYPE: ICD-10-CM

## 2019-02-15 PROCEDURE — 99213 OFFICE O/P EST LOW 20 MIN: CPT | Mod: GE | Performed by: INTERNAL MEDICINE

## 2019-02-15 NOTE — PATIENT INSTRUCTIONS
Menopause  Menopause is the normal time of life when menstrual periods stop completely. Menopause is complete when you have missed 12 consecutive menstrual periods. It usually occurs between the ages of 48 years and 55 years. Very rarely does a woman develop menopause before the age of 40 years. At menopause, your ovaries stop producing the female hormones estrogen and progesterone. This can cause undesirable symptoms and also affect your health. Sometimes the symptoms may occur 4-5 years before the menopause begins. There is no relationship between menopause and:  · Oral contraceptives.  · Number of children you had.  · Race.  · The age your menstrual periods started (menarche).  Heavy smokers and very thin women may develop menopause earlier in life.  What are the causes?  · The ovaries stop producing the female hormones estrogen and progesterone.  Other causes include:  · Surgery to remove both ovaries.  · The ovaries stop functioning for no known reason.  · Tumors of the pituitary gland in the brain.  · Medical disease that affects the ovaries and hormone production.  · Radiation treatment to the abdomen or pelvis.  · Chemotherapy that affects the ovaries.  What are the signs or symptoms?  · Hot flashes.  · Night sweats.  · Decrease in sex drive.  · Vaginal dryness and thinning of the vagina causing painful intercourse.  · Dryness of the skin and developing wrinkles.  · Headaches.  · Tiredness.  · Irritability.  · Memory problems.  · Weight gain.  · Bladder infections.  · Hair growth of the face and chest.  · Infertility.  More serious symptoms include:  · Loss of bone (osteoporosis) causing breaks (fractures).  · Depression.  · Hardening and narrowing of the arteries (atherosclerosis) causing heart attacks and strokes.  How is this diagnosed?  · When the menstrual periods have stopped for 12 straight months.  · Physical exam.  · Hormone studies of the blood.  How is this treated?  There are many treatment  choices and nearly as many questions about them. The decisions to treat or not to treat menopausal changes is an individual choice made with your health care provider. Your health care provider can discuss the treatments with you. Together, you can decide which treatment will work best for you. Your treatment choices may include:  · Hormone therapy (estrogen and progesterone).  · Non-hormonal medicines.  · Treating the individual symptoms with medicine (for example antidepressants for depression).  · Herbal medicines that may help specific symptoms.  · Counseling by a psychiatrist or psychologist.  · Group therapy.  · Lifestyle changes including:  ¨ Eating healthy.  ¨ Regular exercise.  ¨ Limiting caffeine and alcohol.  ¨ Stress management and meditation.  · No treatment.  Follow these instructions at home:  · Take the medicine your health care provider gives you as directed.  · Get plenty of sleep and rest.  · Exercise regularly.  · Eat a diet that contains calcium (good for the bones) and soy products (acts like estrogen hormone).  · Avoid alcoholic beverages.  · Do not smoke.  · If you have hot flashes, dress in layers.  · Take supplements, calcium, and vitamin D to strengthen bones.  · You can use over-the-counter lubricants or moisturizers for vaginal dryness.  · Group therapy is sometimes very helpful.  · Acupuncture may be helpful in some cases.  Contact a health care provider if:  · You are not sure you are in menopause.  · You are having menopausal symptoms and need advice and treatment.  · You are still having menstrual periods after age 55 years.  · You have pain with intercourse.  · Menopause is complete (no menstrual period for 12 months) and you develop vaginal bleeding.  · You need a referral to a specialist (gynecologist, psychiatrist, or psychologist) for treatment.  Get help right away if:  · You have severe depression.  · You have excessive vaginal bleeding.  · You fell and think you have a broken  bone.  · You have pain when you urinate.  · You develop leg or chest pain.  · You have a fast pounding heart beat (palpitations).  · You have severe headaches.  · You develop vision problems.  · You feel a lump in your breast.  · You have abdominal pain or severe indigestion.  This information is not intended to replace advice given to you by your health care provider. Make sure you discuss any questions you have with your health care provider.  Document Released: 03/09/2005 Document Revised: 05/25/2017 Document Reviewed: 07/17/2014  [employer] Interactive Patient Education © 2017 [employer]

## 2019-02-15 NOTE — PROGRESS NOTES
Established Patient    Ameena presents today with the following:    CC: f/u chronic conditions    HPI: Patient is a 43-year-old female with PMHx of HTN,hypothyroidism comes here to follow-up on her chronic conditions.     # Cough: Patient has several months of unresolving cough and improving.Following up with Pulmonology and had PFT's done.Using pulmicort and albuterol.     # HTN: Diagnosed many years back and was on cardizem.Switched to lisinopril and dose was increased to 20 mg for better control of blood pressure. She began to have cough and lisinopril was switched to losartan and later to chlorthalidone. BP has been controlled and stable with current dose of chlorthalidone.currently on potassium supplementation.    # Hypothyroidism : Stable on on current synthroid.Denies related symptoms    # Lung nodule : Stable size in the recent imaging.Following up with pulmonology for her lung issues.    # Recurrent UTI's: Reports no symptoms today in the office. Following up with urology.    Patient Active Problem List    Diagnosis Date Noted   • Cough 12/31/2018   • Lung nodule 10/11/2018   • Restrictive lung disease 09/14/2018   • Allergic rhinitis 04/16/2018   • Overweight (BMI 25.0-29.9) 04/02/2018   • Hypertension 03/22/2018   • Hypothyroidism 03/22/2018   • Hot flashes due to menopause 03/22/2018       Current Outpatient Prescriptions   Medication Sig Dispense Refill   • omeprazole (PRILOSEC) 40 MG delayed-release capsule Take 1 Cap by mouth every day for 30 days. 30 Cap 4   • chlorthalidone (HYGROTON) 25 MG Tab TAKE ONE TABLET BY MOUTH ONE TIME DAILY  30 Tab 0   • levothyroxine (SYNTHROID) 50 MCG Tab take 1 tablet by mouth every morning on an empty stomach 30 Tab 1   • budesonide (PULMICORT FLEXHALER) 90 MCG/ACT AEROSOL POWDER, BREATH ACTIVATED Inhale 2 Puffs by mouth 2 Times a Day. 1 Each 6   • albuterol (PROAIR HFA) 108 (90 Base) MCG/ACT Aero Soln inhalation aerosol Inhale 2 Puffs by mouth every 6 hours as needed  "for Shortness of Breath. 8.5 g 3     No current facility-administered medications for this visit.        Social History     Social History   • Marital status:      Spouse name: N/A   • Number of children: N/A   • Years of education: N/A     Occupational History   • Not on file.     Social History Main Topics   • Smoking status: Never Smoker   • Smokeless tobacco: Never Used   • Alcohol use No   • Drug use: No   • Sexual activity: Yes     Partners: Male     Other Topics Concern   • Not on file     Social History Narrative   • No narrative on file       Family History   Problem Relation Age of Onset   • Hypertension Mother    • Hyperlipidemia Mother        ROS: As per HPI. Additional pertinent symptoms as noted below.    Constitutional: Denies fever/chills/weight changes.   Eyes: Denies changes/pain in vision  ENT: Denies sore throat/congestion/ear ache.   Cardiovascular: Denies chest pain /palpitations/edema.   Respiratory: Denies SOB/cough/PND/orthopnea  Abdomen: Denies difficulty swallowing/ diarrhea/constipation/abdominal pain/nausea/vomiting  Genitourinary: Normal urinary habits.   Musculo-skeletal: normal ambulation.Denies muscle or joint pains.  Skin: Denies rash/lesions.  Neurological: Denies weakness/tingling/numbness/headache  Psychological: good mood and cooperative. Denies anxiety /depression       /74 (BP Location: Right arm, Patient Position: Sitting, BP Cuff Size: Adult)   Pulse 83   Temp 36.3 °C (97.4 °F) (Temporal)   Ht 1.499 m (4' 11\")   Wt 61.8 kg (136 lb 4 oz)   SpO2 99%   BMI 27.52 kg/m²     Physical Exam  General:  Alert and oriented, No apparent distress.    Eyes: Pupils equal and reactive. No scleral icterus.    Throat: Clear no erythema or exudates noted.    Neck: Supple. No lymphadenopathy noted. Thyroid not enlarged.    Lungs: Clear to auscultation and percussion bilaterally.    Cardiovascular: Regular rate and rhythm. No murmurs, rubs or gallops.    Abdomen:  Benign. No " rebound or guarding noted.    Extremities: No clubbing, cyanosis, edema.    Skin: Clear. No rash or suspicious skin lesions noted.    Neurological: Oriented to time, place, and person .Cranial nerves intact. No motor/sensory deficits.Reflexes were normal and symmetrical in both upper and lower extremities     Musculoskeletal : NROM of all extremities. No tenderness or deformity noted.     Note: I have reviewed all pertinent labs and diagnostic tests associated with this visit with specific comments listed under the assessment and plan below      Assessment and Plan    1. Essential hypertension  - stable with chlorthalidone with current dose and under control  - denies any related symptoms    2. Hypothyroidism, unspecified type  - stable with current dose of synthroid  - reports no related symptoms    3. Lung nodule  - stable size in recent imaging and denies related symptoms  - following up with pulmonology    4. Overweight (BMI 25.0-29.9)  - advise to eat healthy-DASH diet  -advise to exercise regularly 30 min a day for 5 days a week    5. History of recurrent UTIs  - following up with urology and recently had imaging studies and pending MRI.  - reports no symptoms today       Signed by: Mani Ball M.D. nr

## 2019-02-21 ENCOUNTER — TELEPHONE (OUTPATIENT)
Dept: PULMONOLOGY | Facility: HOSPICE | Age: 44
End: 2019-02-21

## 2019-02-21 ENCOUNTER — OFFICE VISIT (OUTPATIENT)
Dept: PULMONOLOGY | Facility: HOSPICE | Age: 44
End: 2019-02-21
Payer: COMMERCIAL

## 2019-02-21 VITALS
HEIGHT: 59 IN | OXYGEN SATURATION: 99 % | HEART RATE: 76 BPM | DIASTOLIC BLOOD PRESSURE: 78 MMHG | SYSTOLIC BLOOD PRESSURE: 122 MMHG | TEMPERATURE: 98.2 F | BODY MASS INDEX: 26.61 KG/M2 | RESPIRATION RATE: 14 BRPM | WEIGHT: 132 LBS

## 2019-02-21 DIAGNOSIS — J45.30 MILD PERSISTENT ASTHMA WITHOUT COMPLICATION: ICD-10-CM

## 2019-02-21 DIAGNOSIS — J98.4 RESTRICTIVE LUNG DISEASE: ICD-10-CM

## 2019-02-21 DIAGNOSIS — R21 RASH: ICD-10-CM

## 2019-02-21 DIAGNOSIS — R05.9 COUGH: ICD-10-CM

## 2019-02-21 DIAGNOSIS — R91.1 LUNG NODULE: ICD-10-CM

## 2019-02-21 PROCEDURE — 99214 OFFICE O/P EST MOD 30 MIN: CPT | Performed by: INTERNAL MEDICINE

## 2019-02-21 RX ORDER — ALBUTEROL SULFATE 90 UG/1
2 AEROSOL, METERED RESPIRATORY (INHALATION) EVERY 4 HOURS PRN
Qty: 1 INHALER | Refills: 4 | Status: SHIPPED | OUTPATIENT
Start: 2019-02-21

## 2019-02-21 RX ORDER — ALBUTEROL SULFATE 90 UG/1
2 AEROSOL, METERED RESPIRATORY (INHALATION) EVERY 6 HOURS PRN
Qty: 8.5 G | Refills: 3 | Status: SHIPPED | OUTPATIENT
Start: 2019-02-21 | End: 2019-03-06

## 2019-02-21 RX ORDER — TRIAMCINOLONE ACETONIDE 1 MG/G
CREAM TOPICAL 2 TIMES DAILY
COMMUNITY
End: 2019-04-12

## 2019-02-21 RX ORDER — ALBUTEROL SULFATE 90 UG/1
2 AEROSOL, METERED RESPIRATORY (INHALATION) EVERY 6 HOURS PRN
COMMUNITY
End: 2019-02-21 | Stop reason: SDUPTHER

## 2019-02-21 ASSESSMENT — PAIN SCALES - GENERAL: PAINLEVEL: 2=MINIMAL-SLIGHT

## 2019-02-21 NOTE — TELEPHONE ENCOUNTER
I called [facility] and spoke with Tammie, They will refill Ventolin inhaler as that is what patient is taking. Pro-air re-tracked as it was sent in error.

## 2019-02-21 NOTE — PATIENT INSTRUCTIONS
This lady comes in to follow-up on her cough, she has had improvement with the inhalers, uses Pulmicort and Ventolin.  She indicates her cough is diminished.  Her  is present and helps interpret but she seems to understand fairly well also.    Her imaging did show very tiny nodules likely granulomas and I explained that we would recheck that at one year.  She has developed an itchy rash, and is using Claritin.  No obvious triggers or causes.  Recent blood work looked normal, eosinophil count was 8% not substantially elevated.    We will see her back in about 4-6 months, at that time arrange for the CAT scan at the one-year interval.

## 2019-02-21 NOTE — PROGRESS NOTES
Ameena Hines is a 44 y.o. female here for Cough with reactive airways and lung nodules likely granulomas. Patient was referred by her primary care doctor.    History of Present Illness:      This lady comes in to follow-up on her cough, she has had improvement with the inhalers, uses Pulmicort and Ventolin.  She indicates her cough is diminished.  Her  is present and helps interpret but she seems to understand fairly well also.    Her imaging did show very tiny nodules likely granulomas and I explained that we would recheck that at one year.  She has developed an itchy rash, and is using Claritin.  No obvious triggers or causes.  Recent blood work looked normal, eosinophil count was 8% not substantially elevated.    We will see her back in about 4-6 months, at that time arrange for the CAT scan at the one-year interval.    Her cough according to her is substantially reduced by utilizing the inhalers, I refilled the Pulmicort and albuterol.  She thinks the albuterol reduces her cough most prominently.  I did encourage using the maintenance inhaler and rinse and gargle afterward.    Constitutional ROS: No unexpected change in weight, No unexplained fevers  Eyes: No change in vision or blurring or double vision  Mouth/Throat ROS: No sore throat, No recent change in voice or hoarseness  Pulmonary ROS: See present history for pertinent positives  Cardiovascular ROS: No chest pain to suggest acute coronary syndrome  Gastrointestinal ROS: No abdominal pain to suggest peptic disease  Musculoskeletal/Extremities ROS: no acute artritis or unusual swelling  Hematologic/Lymphatic ROS: No easy bleeding or unusual lymph node swelling  Neurologic ROS: No new or unusual weakness  Psychiatric ROS: No hallucinations  Allergic/Immunologic: Rash on inner arms and forehead, erythematous, no obvious triggers or causes, she asks if cauliflower or broccoli would cause it, I doubt this.  I did urge follow-up with urgent care or  "primary care if this persists.      Current Outpatient Prescriptions   Medication Sig Dispense Refill   • triamcinolone acetonide (KENALOG) 0.1 % Cream Apply  to affected area(s) 2 times a day.     • budesonide (PULMICORT FLEXHALER) 90 MCG/ACT AEROSOL POWDER, BREATH ACTIVATED Inhale 2 Puffs by mouth 2 Times a Day. 1 Each 6   • albuterol (PROAIR HFA) 108 (90 Base) MCG/ACT Aero Soln inhalation aerosol Inhale 2 Puffs by mouth every 6 hours as needed for Shortness of Breath. 8.5 g 3   • albuterol (VENTOLIN HFA) 108 (90 Base) MCG/ACT Aero Soln inhalation aerosol Inhale 2 Puffs by mouth every four hours as needed for Shortness of Breath. 1 Inhaler 4   • omeprazole (PRILOSEC) 40 MG delayed-release capsule Take 1 Cap by mouth every day for 30 days. 30 Cap 4   • chlorthalidone (HYGROTON) 25 MG Tab TAKE ONE TABLET BY MOUTH ONE TIME DAILY  30 Tab 0   • levothyroxine (SYNTHROID) 50 MCG Tab take 1 tablet by mouth every morning on an empty stomach 30 Tab 1     No current facility-administered medications for this visit.        Social History   Substance Use Topics   • Smoking status: Never Smoker   • Smokeless tobacco: Never Used   • Alcohol use No        Past Medical History:   Diagnosis Date   • Chickenpox    • Hyperlipidemia    • Hypertension    • Thyroid disease        Past Surgical History:   Procedure Laterality Date   • LAPAROSCOPY         Allergies: Eggplant    Family History   Problem Relation Age of Onset   • Hypertension Mother    • Hyperlipidemia Mother        Physical Examination    Vitals:    02/21/19 0944   Height: 1.499 m (4' 11\")   Weight: 59.9 kg (132 lb)   Weight % change since last entry.: 0 %   BP: 122/78   Pulse: 76   BMI (Calculated): 26.66   Resp: 14   Temp: 36.8 °C (98.2 °F)   TempSrc: Oral       General Appearance: alert, no distress  Skin: Skin color, texture, turgor normal. No rashes or lesions.  Eyes: negative  Oropharynx: Lips, mucosa, and tongue normal. Teeth and gums normal. Oropharynx moist and " without lesion  Lungs: positive findings: Clear without wheezes rhonchi or cough  Heart: negative. RRR without murmur, gallop, or rubs.  No ectopy.  Abdomen: Abdomen soft, non-tender. . No masses,  No organomegaly  Extremities:  No deformities, edema, or skin discoloration  Joints: No acute arthritis  Peripheral Pulses:perfused  Neurologic: intact grossly  Erythematous rash forehead and inner arms right and left    I (soft palate, uvula, fauces, tonsillar pillars visible)    Imaging: Described above    PFTS: Noted previously      Assessment and Plan  1. Mild persistent asthma without complication    2. Lung nodule    3. Cough    4. Rash    5.  Reactive airways likely with cough responding to inhalers and short acting beta agonist    - budesonide (PULMICORT FLEXHALER) 90 MCG/ACT AEROSOL POWDER, BREATH ACTIVATED; Inhale 2 Puffs by mouth 2 Times a Day.  Dispense: 1 Each; Refill: 6    This lady comes in to follow-up on her cough, she has had improvement with the inhalers, uses Pulmicort and Ventolin.  She indicates her cough is diminished.  Her  is present and helps interpret but she seems to understand fairly well also.    Her imaging did show very tiny nodules likely granulomas and I explained that we would recheck that at one year.  She has developed an itchy rash, and is using Claritin.  No obvious triggers or causes.  Recent blood work looked normal, eosinophil count was 8% not substantially elevated.    We will see her back in about 4-6 months, at that time arrange for the CAT scan at the one-year interval.  Followup Return in about 6 months (around 8/21/2019) for With MD or APRN.

## 2019-03-06 ENCOUNTER — HOSPITAL ENCOUNTER (OUTPATIENT)
Dept: RADIOLOGY | Facility: MEDICAL CENTER | Age: 44
End: 2019-03-06
Attending: INTERNAL MEDICINE
Payer: COMMERCIAL

## 2019-03-06 ENCOUNTER — OFFICE VISIT (OUTPATIENT)
Dept: INTERNAL MEDICINE | Facility: MEDICAL CENTER | Age: 44
End: 2019-03-06
Payer: COMMERCIAL

## 2019-03-06 VITALS
HEART RATE: 89 BPM | OXYGEN SATURATION: 96 % | BODY MASS INDEX: 27.21 KG/M2 | DIASTOLIC BLOOD PRESSURE: 82 MMHG | HEIGHT: 59 IN | SYSTOLIC BLOOD PRESSURE: 116 MMHG | WEIGHT: 135 LBS | TEMPERATURE: 98 F

## 2019-03-06 DIAGNOSIS — J06.9 UPPER RESPIRATORY TRACT INFECTION, UNSPECIFIED TYPE: ICD-10-CM

## 2019-03-06 DIAGNOSIS — R07.89 ATYPICAL CHEST PAIN: ICD-10-CM

## 2019-03-06 PROCEDURE — 71046 X-RAY EXAM CHEST 2 VIEWS: CPT

## 2019-03-06 PROCEDURE — 99213 OFFICE O/P EST LOW 20 MIN: CPT | Mod: GE | Performed by: INTERNAL MEDICINE

## 2019-03-06 RX ORDER — METHYLPREDNISOLONE 4 MG/1
TABLET ORAL
Qty: 21 TAB | Refills: 0 | Status: SHIPPED | OUTPATIENT
Start: 2019-03-06 | End: 2019-04-12

## 2019-03-06 ASSESSMENT — PAIN SCALES - GENERAL: PAINLEVEL: NO PAIN

## 2019-03-07 NOTE — PATIENT INSTRUCTIONS
"Upper Respiratory Infection, Adult  Most upper respiratory infections (URIs) are a viral infection of the air passages leading to the lungs. A URI affects the nose, throat, and upper air passages. The most common type of URI is nasopharyngitis and is typically referred to as \"the common cold.\"  URIs run their course and usually go away on their own. Most of the time, a URI does not require medical attention, but sometimes a bacterial infection in the upper airways can follow a viral infection. This is called a secondary infection. Sinus and middle ear infections are common types of secondary upper respiratory infections.  Bacterial pneumonia can also complicate a URI. A URI can worsen asthma and chronic obstructive pulmonary disease (COPD). Sometimes, these complications can require emergency medical care and may be life threatening.  What are the causes?  Almost all URIs are caused by viruses. A virus is a type of germ and can spread from one person to another.  What increases the risk?  You may be at risk for a URI if:  · You smoke.  · You have chronic heart or lung disease.  · You have a weakened defense (immune) system.  · You are very young or very old.  · You have nasal allergies or asthma.  · You work in crowded or poorly ventilated areas.  · You work in health care facilities or schools.  What are the signs or symptoms?  Symptoms typically develop 2-3 days after you come in contact with a cold virus. Most viral URIs last 7-10 days. However, viral URIs from the influenza virus (flu virus) can last 14-18 days and are typically more severe. Symptoms may include:  · Runny or stuffy (congested) nose.  · Sneezing.  · Cough.  · Sore throat.  · Headache.  · Fatigue.  · Fever.  · Loss of appetite.  · Pain in your forehead, behind your eyes, and over your cheekbones (sinus pain).  · Muscle aches.  How is this diagnosed?  Your health care provider may diagnose a URI by:  · Physical exam.  · Tests to check that your " symptoms are not due to another condition such as:  ¨ Strep throat.  ¨ Sinusitis.  ¨ Pneumonia.  ¨ Asthma.  How is this treated?  A URI goes away on its own with time. It cannot be cured with medicines, but medicines may be prescribed or recommended to relieve symptoms. Medicines may help:  · Reduce your fever.  · Reduce your cough.  · Relieve nasal congestion.  Follow these instructions at home:  · Take medicines only as directed by your health care provider.  · Gargle warm saltwater or take cough drops to comfort your throat as directed by your health care provider.  · Use a warm mist humidifier or inhale steam from a shower to increase air moisture. This may make it easier to breathe.  · Drink enough fluid to keep your urine clear or pale yellow.  · Eat soups and other clear broths and maintain good nutrition.  · Rest as needed.  · Return to work when your temperature has returned to normal or as your health care provider advises. You may need to stay home longer to avoid infecting others. You can also use a face mask and careful hand washing to prevent spread of the virus.  · Increase the usage of your inhaler if you have asthma.  · Do not use any tobacco products, including cigarettes, chewing tobacco, or electronic cigarettes. If you need help quitting, ask your health care provider.  How is this prevented?  The best way to protect yourself from getting a cold is to practice good hygiene.  · Avoid oral or hand contact with people with cold symptoms.  · Wash your hands often if contact occurs.  There is no clear evidence that vitamin C, vitamin E, echinacea, or exercise reduces the chance of developing a cold. However, it is always recommended to get plenty of rest, exercise, and practice good nutrition.  Contact a health care provider if:  · You are getting worse rather than better.  · Your symptoms are not controlled by medicine.  · You have chills.  · You have worsening shortness of breath.  · You have brown  or red mucus.  · You have yellow or brown nasal discharge.  · You have pain in your face, especially when you bend forward.  · You have a fever.  · You have swollen neck glands.  · You have pain while swallowing.  · You have white areas in the back of your throat.  Get help right away if:  · You have severe or persistent:  ¨ Headache.  ¨ Ear pain.  ¨ Sinus pain.  ¨ Chest pain.  · You have chronic lung disease and any of the following:  ¨ Wheezing.  ¨ Prolonged cough.  ¨ Coughing up blood.  ¨ A change in your usual mucus.  · You have a stiff neck.  · You have changes in your:  ¨ Vision.  ¨ Hearing.  ¨ Thinking.  ¨ Mood.  This information is not intended to replace advice given to you by your health care provider. Make sure you discuss any questions you have with your health care provider.  Document Released: 06/13/2002 Document Revised: 08/20/2017 Document Reviewed: 03/25/2015  [employer] Interactive Patient Education © 2017 [employer]

## 2019-03-07 NOTE — PROGRESS NOTES
Established Patient    Ameena presents today with the following:    CC: SOB    HPI: 44-year-old female patient with chronic history of breathing problems comes in for acute visit.  Patient states for the past 1 week she has been experiencing worsening shortness of breath from her baseline associated with cough which is worse at night with yellowish sputum production, fatigue, watery discharge from the eyes, generalized body pains, chills.  Patient has been experiencing intermittent heartburn -though patient is on PPI and Intermittent left-sided burning chest pain with changing locations(patient points out at different spots on the chest on the left side and backside of the chest and states location of the pain keeps changing).  Patient also experiences shortness of breath on deep inspiration.  Denies fever, nausea, vomiting, abdominal pain, constipation, diarrhea, urinary problems.  Patient has a chronic history of breathing issues the past.  Follows up with pulmonologist.  Currently on albuterol and Pulmicort inhalers.  Patient states her shortness of breath is worsening in the last few days.     Patient Active Problem List    Diagnosis Date Noted   • Rash 02/21/2019   • Mild persistent asthma without complication 02/21/2019   • Cough 12/31/2018   • Lung nodule 10/11/2018   • Restrictive lung disease 09/14/2018   • Allergic rhinitis 04/16/2018   • Overweight (BMI 25.0-29.9) 04/02/2018   • Hypertension 03/22/2018   • Hypothyroidism 03/22/2018   • Hot flashes due to menopause 03/22/2018       Current Outpatient Prescriptions   Medication Sig Dispense Refill   • MethylPREDNISolone (MEDROL DOSEPAK) 4 MG Tablet Therapy Pack As directed on the packaging label. 21 Tab 0   • budesonide (PULMICORT FLEXHALER) 90 MCG/ACT AEROSOL POWDER, BREATH ACTIVATED Inhale 2 Puffs by mouth 2 Times a Day. 1 Each 6   • albuterol (VENTOLIN HFA) 108 (90 Base) MCG/ACT Aero Soln inhalation aerosol Inhale 2 Puffs by mouth every four hours as  "needed for Shortness of Breath. 1 Inhaler 4   • omeprazole (PRILOSEC) 40 MG delayed-release capsule Take 1 Cap by mouth every day for 30 days. 30 Cap 4   • chlorthalidone (HYGROTON) 25 MG Tab TAKE ONE TABLET BY MOUTH ONE TIME DAILY  30 Tab 0   • levothyroxine (SYNTHROID) 50 MCG Tab take 1 tablet by mouth every morning on an empty stomach 30 Tab 1   • triamcinolone acetonide (KENALOG) 0.1 % Cream Apply  to affected area(s) 2 times a day.       No current facility-administered medications for this visit.        Social History     Social History   • Marital status:      Spouse name: N/A   • Number of children: N/A   • Years of education: N/A     Occupational History   • Not on file.     Social History Main Topics   • Smoking status: Never Smoker   • Smokeless tobacco: Never Used   • Alcohol use No   • Drug use: No   • Sexual activity: Yes     Partners: Male     Other Topics Concern   • Not on file     Social History Narrative   • No narrative on file       Family History   Problem Relation Age of Onset   • Hypertension Mother    • Hyperlipidemia Mother        ROS: As per HPI. Additional pertinent symptoms as noted below.    Negative except mentioned in HPI.    /82 (BP Location: Right arm, Patient Position: Sitting, BP Cuff Size: Adult)   Pulse 89   Temp 36.7 °C (98 °F) (Temporal)   Ht 1.499 m (4' 11\")   Wt 61.2 kg (135 lb)   SpO2 96%   Breastfeeding? No   BMI 27.27 kg/m²     Physical Exam  General:  Alert and oriented, No apparent distress.    Eyes: Pupils equal and reactive. No scleral icterus.    Throat: Clear no erythema or exudates noted.    Neck: Supple. No lymphadenopathy noted. Thyroid not enlarged.    Lungs: Decreased bilateral breath sounds at the bases and normal percussion bilaterally.    Cardiovascular: Regular rate and rhythm. No murmurs, rubs or gallops.    Abdomen:  Benign. No rebound or guarding noted.    Extremities: No clubbing, cyanosis, edema.    Skin: Clear. No rash or " suspicious skin lesions noted.    Neurological: Oriented to time, place, and person .Cranial nerves intact. No motor/sensory deficits.Reflexes were normal and symmetrical in both upper and lower extremities     Musculoskeletal : NROM of all extremities. No tenderness or deformity noted.     Note: I have reviewed all pertinent labs and diagnostic tests associated with this visit with specific comments listed under the assessment and plan below      Assessment and Plan    1. Upper respiratory tract infection, unspecified type  - did not consider POCT Rapid Strep A centor criteria 0/4  - likely viral induced symptoms  -Ordered Medrol Dosepak  - advised to drink plenty of fluids  - advised to continue to use OTC decongestants, antihistamine  - advise to drink warm fluids like hot tea, soups. Gargle with salt and water to help sooth throat  - advised to use throat lozenges for symptomatic relief of throat discomfort  - advised rest and humidifier next to bed while sleeping to help with symptoms.  - will follow up with CXR to rule out pneumonia given her underlying lung issues in the past.  We will call the patient after reviewing the results of the x-ray if abnormal.  - advised to return if symptoms worsen     2. Atypical chest pain  Intermittent heart burn and also left sided chest pain with changing locations ( patient points out to different locations at different times)--unclear etiology but given her chronic breathing issues and also interstitial lung disease ordered echocardiogram.      Signed by: Mani Ball M.D.

## 2019-03-11 ENCOUNTER — APPOINTMENT (OUTPATIENT)
Dept: INTERNAL MEDICINE | Facility: MEDICAL CENTER | Age: 44
End: 2019-03-11
Payer: COMMERCIAL

## 2019-03-15 ENCOUNTER — HOSPITAL ENCOUNTER (OUTPATIENT)
Dept: RADIOLOGY | Facility: MEDICAL CENTER | Age: 44
End: 2019-03-15
Attending: UROLOGY
Payer: COMMERCIAL

## 2019-03-15 DIAGNOSIS — Z87.440 HISTORY OF URINARY TRACT INFECTION: ICD-10-CM

## 2019-03-15 DIAGNOSIS — R31.0 GROSS HEMATURIA: ICD-10-CM

## 2019-03-15 PROCEDURE — 700117 HCHG RX CONTRAST REV CODE 255: Performed by: UROLOGY

## 2019-03-15 PROCEDURE — A9585 GADOBUTROL INJECTION: HCPCS | Performed by: UROLOGY

## 2019-03-15 PROCEDURE — 74183 MRI ABD W/O CNTR FLWD CNTR: CPT

## 2019-03-15 RX ORDER — GADOBUTROL 604.72 MG/ML
6 INJECTION INTRAVENOUS ONCE
Status: COMPLETED | OUTPATIENT
Start: 2019-03-15 | End: 2019-03-15

## 2019-03-15 RX ADMIN — GADOBUTROL 7.5 ML: 604.72 INJECTION INTRAVENOUS at 10:36

## 2019-03-27 ENCOUNTER — HOSPITAL ENCOUNTER (OUTPATIENT)
Dept: CARDIOLOGY | Facility: MEDICAL CENTER | Age: 44
End: 2019-03-27
Attending: INTERNAL MEDICINE
Payer: COMMERCIAL

## 2019-03-27 DIAGNOSIS — R07.89 ATYPICAL CHEST PAIN: ICD-10-CM

## 2019-03-27 LAB
LV EJECT FRACT  99904: 65
LV EJECT FRACT MOD 2C 99903: 64.16
LV EJECT FRACT MOD 4C 99902: 66.31
LV EJECT FRACT MOD BP 99901: 66.59

## 2019-03-27 PROCEDURE — 93306 TTE W/DOPPLER COMPLETE: CPT | Mod: 26 | Performed by: INTERNAL MEDICINE

## 2019-03-27 PROCEDURE — 93306 TTE W/DOPPLER COMPLETE: CPT

## 2019-04-05 ENCOUNTER — OFFICE VISIT (OUTPATIENT)
Dept: PULMONOLOGY | Facility: HOSPICE | Age: 44
End: 2019-04-05
Payer: COMMERCIAL

## 2019-04-05 VITALS
HEIGHT: 59 IN | WEIGHT: 135 LBS | OXYGEN SATURATION: 97 % | HEART RATE: 94 BPM | BODY MASS INDEX: 27.21 KG/M2 | DIASTOLIC BLOOD PRESSURE: 74 MMHG | SYSTOLIC BLOOD PRESSURE: 117 MMHG | RESPIRATION RATE: 16 BRPM

## 2019-04-05 DIAGNOSIS — J98.4 RESTRICTIVE LUNG DISEASE: ICD-10-CM

## 2019-04-05 DIAGNOSIS — L30.9 ECZEMA OF HAND: ICD-10-CM

## 2019-04-05 DIAGNOSIS — R05.9 COUGH: ICD-10-CM

## 2019-04-05 PROCEDURE — 99213 OFFICE O/P EST LOW 20 MIN: CPT | Performed by: PHYSICIAN ASSISTANT

## 2019-04-05 RX ORDER — PREDNISONE 10 MG/1
TABLET ORAL
Qty: 18 TAB | Refills: 0 | Status: SHIPPED | OUTPATIENT
Start: 2019-04-05

## 2019-04-05 RX ORDER — BUDESONIDE AND FORMOTEROL FUMARATE DIHYDRATE 160; 4.5 UG/1; UG/1
2 AEROSOL RESPIRATORY (INHALATION) 2 TIMES DAILY
Qty: 1 INHALER | Refills: 0 | Status: SHIPPED | OUTPATIENT
Start: 2019-04-05 | End: 2019-05-17 | Stop reason: SDUPTHER

## 2019-04-05 RX ORDER — BENZONATATE 100 MG/1
100 CAPSULE ORAL 3 TIMES DAILY PRN
Qty: 60 CAP | Refills: 1 | Status: SHIPPED | OUTPATIENT
Start: 2019-04-05

## 2019-04-05 NOTE — PATIENT INSTRUCTIONS
1-cream for hands  2-mask for cooking  3-steroid prednisone (anti-inflammation) x 9 days, reviewed side effects, take with food  4-over the counter liquid cough medicine and prescription tablets if needed  5-daily Symbicort inhaler 2 puffs 2 times per day with spacer  6-rinse mouth really well after    7-continue 2 puffs Ventolin inhaler when needed not more often than 4 hours apart

## 2019-04-08 NOTE — PROGRESS NOTES
CC: Cough worse at night    HPI:  Ameena Hines is a 44 y.o. year old female here today for sick visit.  Patient last seen in clinic on 2/21/2019 by Dr. Blue.  Patient is a never smoker.  Reviewed in clinic vitals including blood pressure of 117/74, heart rate of 94, O2 sat of 97%, BMI of 27.27 kg/m².    Patient is accompanied by her  as always.  There is some language barrier present but  declined at this time.  Patient reports cooking leaning over hot oil cooking utensil on a daily basis.  Mask advised.    Last chest x-ray completed on 3/6/2019 demonstrated no pulmonary infiltrates or consolidations, no pleural effusions or pneumothorax, normal cardiopericardial silhouette.      Patient did have a follow-up chest CT on 1/23/2019 which demonstrated unchanged appearance of the largest of the previously identified right middle lobe subpleural pulmonary nodules which measures 4 mm in the right middle lobe, other previously identified nodules now appear to be of a linear scar rather than nodules.  Per Dr. Blue's note at last encounter, follow-up CT in 1 year.    Patient did have an echo completed 3/27/2019 which demonstrated per cardiology interpretation normal left ventricular chamber size, left ventricular ejection fraction estimated at 65%, mild tricuspid regurg.  Normal right atrium and ventricle size and function.    Patient did have CT maxillofacial in September 2018 demonstrating per radiology interpretation mucosal thickening within multiple ethmoid air cells, small amount of fluid and mucosal thickening within both sphenoid sinuses, minimal right mastoid sinus mucosal thickening, nasal septum deviated to right.    Patient did have pulmonary function testing completed 10/15/2018 which demonstrated FVC of 1.84 L or 62% predicted, FEV1 of 1.57 L or 66% predicted, FEV1/FVC ratio 85% predicted, FEF 25-75% of 79% predicted, residual volume of 145% predicted, total lung capacity of 95% predicted,  DLCO of 129% predicted.  No significant bronchodilator response.  Per pulmonologist interpretation spirometry shows pseudo-restriction secondary to BMI of 28.  Normal total lung capacity and diffusion capacity.    Extensive discussion regarding use of Ventolin inhaler and, limit to minimum 4 hours between dosing.  Patient was seen by internal medicine on 3/6/2019 for worsening cough and shortness of breath.  At that time she was ordered a Medrol Dosepak with negative chest x-ray.      ROS:   Constitutional: Occasional chills, increased fatigue, denies fevers, night sweats or unintentional weight loss  Skin: Between thumb and index finger on both hands mild rash, denies hair or nail changes, lumps or sores   Eyes: Does not wear glasses, denies new or sudden vision changes  Ears, Nose, Throat:  history of allergies, reports dry sinuses, reports persistent hoarseness without sore throat, reports clogged ears, reports nasal congestion and postnasal drip but no runny nose   GI: No heartburn/reflux, completed trial of omeprazole without change in symptoms, occasional difficulty swallowing  Respiratory: Ongoing cough with white production, denies wheezing, mild shortness of breath at rest, mild to moderate with activity, denies past history of pneumonia, bronchitis, TB or occupational exposure  CV: Reports some irregularity of heartbeat, denies chest pain, tightness, heart murmur, orthopnea or edema      Past Medical History:   Diagnosis Date   • Chickenpox    • Hyperlipidemia    • Hypertension    • Thyroid disease        Past Surgical History:   Procedure Laterality Date   • LAPAROSCOPY         Family History   Problem Relation Age of Onset   • Hypertension Mother    • Hyperlipidemia Mother        Social History     Social History   • Marital status:      Spouse name: N/A   • Number of children: N/A   • Years of education: N/A     Occupational History   • Not on file.     Social History Main Topics   • Smoking  "status: Never Smoker   • Smokeless tobacco: Never Used   • Alcohol use No   • Drug use: No   • Sexual activity: Yes     Partners: Male     Other Topics Concern   • Not on file     Social History Narrative   • No narrative on file       Allergies as of 04/05/2019 - Reviewed 04/05/2019   Allergen Reaction Noted   • Eggplant Swelling 01/04/2019        @Vital signs for this encounter:  Vitals:    04/05/19 1133 04/05/19 1134   Height: 1.499 m (4' 11\")    Weight: 61.2 kg (135 lb)    Weight % change since last entry.: 0 %    BP: 117/74    Pulse: 94    BMI (Calculated): 27.27    Resp: 16    O2 sat % room air:  97 %       Current medications as of today   Current Outpatient Prescriptions   Medication Sig Dispense Refill   • budesonide-formoterol (SYMBICORT) 160-4.5 MCG/ACT Aerosol Inhale 2 Puffs by mouth 2 Times a Day. Use spacer. Rinse mouth after each use. 1 Inhaler 0   • predniSONE (DELTASONE) 10 MG Tab Take 30mg x 3 days, then take 20mg x 3 days, then take 10mg x 3 days, with food, then discontinue. 18 Tab 0   • benzonatate (TESSALON) 100 MG Cap Take 1 Cap by mouth 3 times a day as needed for Cough. Do not chew.  Swallow whole. 60 Cap 1   • hydrocortisone 2.5 % Cream topical cream Apply 1 Application to affected area(s) 2 times a day. 1 Tube 1   • levothyroxine (SYNTHROID) 50 MCG Tab TAKE ONE TABLET BY MOUTH IN THE MORNING ON AN EMPTY STOMACH 30 Tab 1   • chlorthalidone (HYGROTON) 25 MG Tab TAKE ONE TABLET BY MOUTH ONE TIME DAILY  30 Tab 1   • budesonide (PULMICORT FLEXHALER) 90 MCG/ACT AEROSOL POWDER, BREATH ACTIVATED Inhale 2 Puffs by mouth 2 Times a Day. 1 Each 6   • MethylPREDNISolone (MEDROL DOSEPAK) 4 MG Tablet Therapy Pack As directed on the packaging label. (Patient not taking: Reported on 4/5/2019) 21 Tab 0   • triamcinolone acetonide (KENALOG) 0.1 % Cream Apply  to affected area(s) 2 times a day.     • albuterol (VENTOLIN HFA) 108 (90 Base) MCG/ACT Aero Soln inhalation aerosol Inhale 2 Puffs by mouth every four " hours as needed for Shortness of Breath. 1 Inhaler 4     No current facility-administered medications for this visit.          Physical Exam:   Gen:           Alert and oriented, No apparent distress. Mood and affect appropriate, normal interaction with provider.  Eyes:          sclere white, conjunctive moist.  Hearing:     Grossly intact.  Dentition:    Good dentition.  Oropharynx:   Tongue normal, posterior pharynx with mild erythema no exudate.  Mallampati Classification:II  Neck:        Supple, trachea midline, no masses.  Respiratory Effort: No intercostal retractions or use of accessory muscles.   Lung Auscultation:      Somewhat decreased with a few scattered crackles   CV:            Regular rate and rhythm. No edema. No murmurs, rubs or gallops appreciated  Digits, Nails, Ext: No clubbing, cyanosis, petechiae, or nodes.   Skin:        Eczema appearing rash on thenar space and webbing, no lesions or ulcers noted on back or exposed skin surfaces                Assessment:  1. Restrictive lung disease   mask for cooking, daily Symbicort but hold Pulmicort, rinse thoroughly after, use with spacer   2. Cough   OTC cough medicine, benzonatate   3. Eczema of hand   hydrocortisone topical cream       Immunizations:    Flu: 10/15/2018  Pneumovax 23: Deferred  Prevnar 13: Deferred    Plan:  1-cream for hands  2-mask for cooking  3-steroid prednisone (anti-inflammation) x 9 days, reviewed side effects, take with food  4-over the counter liquid cough medicine and prescription tablets if needed  5-daily Symbicort inhaler 2 puffs 2 times per day with spacer, no Pulmicort  6-rinse mouth really well after    7-continue 2 puffs Ventolin inhaler when needed not more often than 4 hours apart  8-follow-up 6 weeks with medication changes    Per Dr. Blue's note at last encounter follow-up chest CT in January 2020.  Has appointment Dr. Blue December 2019, consider CT prior to appointment.      This dictation was created using  voice recognition software. The accuracy of the dictation is limited to the abilities of the software. I expect there may be some errors of grammar and possibly content.

## 2019-04-12 ENCOUNTER — OFFICE VISIT (OUTPATIENT)
Dept: INTERNAL MEDICINE | Facility: MEDICAL CENTER | Age: 44
End: 2019-04-12
Payer: COMMERCIAL

## 2019-04-12 VITALS
DIASTOLIC BLOOD PRESSURE: 62 MMHG | HEIGHT: 59 IN | SYSTOLIC BLOOD PRESSURE: 100 MMHG | WEIGHT: 136 LBS | OXYGEN SATURATION: 97 % | TEMPERATURE: 97.7 F | HEART RATE: 75 BPM | BODY MASS INDEX: 27.42 KG/M2

## 2019-04-12 DIAGNOSIS — J98.4 RESTRICTIVE LUNG DISEASE: ICD-10-CM

## 2019-04-12 DIAGNOSIS — E66.3 OVERWEIGHT (BMI 25.0-29.9): ICD-10-CM

## 2019-04-12 DIAGNOSIS — I10 HYPERTENSION, UNSPECIFIED TYPE: ICD-10-CM

## 2019-04-12 DIAGNOSIS — E03.9 HYPOTHYROIDISM, UNSPECIFIED TYPE: ICD-10-CM

## 2019-04-12 PROCEDURE — 99213 OFFICE O/P EST LOW 20 MIN: CPT | Mod: GE | Performed by: INTERNAL MEDICINE

## 2019-04-12 ASSESSMENT — PAIN SCALES - GENERAL: PAINLEVEL: NO PAIN

## 2019-04-12 NOTE — PROGRESS NOTES
Established Patient    Ameena presents today with the following:    CC: f/u chronic problems    HPI: Patient is a 43-year-old female with PMHx of HTN,hypothyroidism comes here to follow-up on her chronic conditions.     # Cough: Patient has several months to 1 yr history of intermittent coughing spells.Following up with Pulmonology and had PFT's done.Recent visit with pulmonology -stop pulmicort and start symbicort and continue albuterol.Using prednisone with good control of symptoms.     # HTN: Diagnosed many years back and was on cardizem.Switched to lisinopril and dose was increased to 20 mg for better control of blood pressure. She began to have cough and lisinopril was switched to losartan and later to chlorthalidone. BP has been controlled and stable with current dose of chlorthalidone.     # Hypothyroidism : Stable on on current synthroid.Denies related symptoms     # Lung nodule : Stable size in the recent imaging.Following up with pulmonology for her lung issues.    Patient Active Problem List    Diagnosis Date Noted   • Rash 02/21/2019   • Mild persistent asthma without complication 02/21/2019   • Cough 12/31/2018   • Lung nodule 10/11/2018   • Restrictive lung disease 09/14/2018   • Allergic rhinitis 04/16/2018   • Overweight (BMI 25.0-29.9) 04/02/2018   • Hypertension 03/22/2018   • Hypothyroidism 03/22/2018   • Hot flashes due to menopause 03/22/2018       Current Outpatient Prescriptions   Medication Sig Dispense Refill   • budesonide-formoterol (SYMBICORT) 160-4.5 MCG/ACT Aerosol Inhale 2 Puffs by mouth 2 Times a Day. Use spacer. Rinse mouth after each use. 1 Inhaler 0   • predniSONE (DELTASONE) 10 MG Tab Take 30mg x 3 days, then take 20mg x 3 days, then take 10mg x 3 days, with food, then discontinue. 18 Tab 0   • benzonatate (TESSALON) 100 MG Cap Take 1 Cap by mouth 3 times a day as needed for Cough. Do not chew.  Swallow whole. 60 Cap 1   • hydrocortisone 2.5 % Cream topical cream Apply 1  "Application to affected area(s) 2 times a day. 1 Tube 1   • levothyroxine (SYNTHROID) 50 MCG Tab TAKE ONE TABLET BY MOUTH IN THE MORNING ON AN EMPTY STOMACH 30 Tab 1   • chlorthalidone (HYGROTON) 25 MG Tab TAKE ONE TABLET BY MOUTH ONE TIME DAILY  30 Tab 1   • albuterol (VENTOLIN HFA) 108 (90 Base) MCG/ACT Aero Soln inhalation aerosol Inhale 2 Puffs by mouth every four hours as needed for Shortness of Breath. 1 Inhaler 4     No current facility-administered medications for this visit.        Social History     Social History   • Marital status:      Spouse name: N/A   • Number of children: N/A   • Years of education: N/A     Occupational History   • Not on file.     Social History Main Topics   • Smoking status: Never Smoker   • Smokeless tobacco: Never Used   • Alcohol use No   • Drug use: No   • Sexual activity: Yes     Partners: Male     Other Topics Concern   • Not on file     Social History Narrative   • No narrative on file       Family History   Problem Relation Age of Onset   • Hypertension Mother    • Hyperlipidemia Mother        ROS: As per HPI. Additional pertinent symptoms as noted below.  Constitutional: Denies fever/chills/weight changes.   Eyes: Denies changes/pain in vision  ENT: Denies sore throat/congestion/ear ache.   Cardiovascular: Denies chest pain /palpitations/edema.   Respiratory: Denies SOB/cough/PND/orthopnea  Abdomen: Denies difficulty swallowing/ diarrhea/constipation/abdominal pain/nausea/vomiting  Genitourinary: Normal urinary habits.   Musculo-skeletal: normal ambulation.Denies muscle or joint pains.  Skin: Denies rash/lesions.  Neurological: Denies weakness/tingling/numbness/headache  Psychological: good mood and cooperative. Denies anxiety /depression       /62 (BP Location: Right arm, Patient Position: Sitting, BP Cuff Size: Adult)   Pulse 75   Temp 36.5 °C (97.7 °F) (Temporal)   Ht 1.499 m (4' 11\")   Wt 61.7 kg (136 lb)   SpO2 97%   Breastfeeding? No   BMI 27.47 " kg/m²     Physical Exam  General:  Alert and oriented, No apparent distress.    Eyes: Pupils equal and reactive. No scleral icterus.    Throat: Clear no erythema or exudates noted.    Neck: Supple. No lymphadenopathy noted. Thyroid not enlarged.    Lungs: Clear to auscultation and percussion bilaterally.    Cardiovascular: Regular rate and rhythm. No murmurs, rubs or gallops.    Abdomen:  Benign. No rebound or guarding noted.    Extremities: No clubbing, cyanosis, edema.    Skin: Clear. No rash or suspicious skin lesions noted.    Neurological: Oriented to time, place, and person .Cranial nerves intact. No motor/sensory deficits.Reflexes were normal and symmetrical in both upper and lower extremities     Musculoskeletal : NROM of all extremities. No tenderness or deformity noted.     Note: I have reviewed all pertinent labs and diagnostic tests associated with this visit with specific comments listed under the assessment and plan below      Assessment and Plan    1. Restrictive lung disease  Following up with pulmonology  On symbicort and albuterol.  Prednisone prescribed by pulmonology.  Continue to follow up    2. Hypertension, unspecified type  Controlled with chlorthalidone.  Stable   Will consider checking BMP next visit to monitor K+    3. Hypothyroidism, unspecified type  Stable on current dose of levothyroxine  Denies related symptoms    4. Overweight (BMI 25.0-29.9)  Advise to eat healthy-DASH diet  Advise to exercise regularly 30 mins a day for 5 days a week      Signed by: Mani Ball M.D.

## 2019-04-12 NOTE — PATIENT INSTRUCTIONS
Hypertension  Hypertension, commonly called high blood pressure, is when the force of blood pumping through your arteries is too strong. Your arteries are the blood vessels that carry blood from your heart throughout your body. A blood pressure reading consists of a higher number over a lower number, such as 110/72. The higher number (systolic) is the pressure inside your arteries when your heart pumps. The lower number (diastolic) is the pressure inside your arteries when your heart relaxes. Ideally you want your blood pressure below 120/80.  Hypertension forces your heart to work harder to pump blood. Your arteries may become narrow or stiff. Having untreated or uncontrolled hypertension can cause heart attack, stroke, kidney disease, and other problems.  What increases the risk?  Some risk factors for high blood pressure are controllable. Others are not.  Risk factors you cannot control include:  · Race. You may be at higher risk if you are .  · Age. Risk increases with age.  · Gender. Men are at higher risk than women before age 45 years. After age 65, women are at higher risk than men.  Risk factors you can control include:  · Not getting enough exercise or physical activity.  · Being overweight.  · Getting too much fat, sugar, calories, or salt in your diet.  · Drinking too much alcohol.  What are the signs or symptoms?  Hypertension does not usually cause signs or symptoms. Extremely high blood pressure (hypertensive crisis) may cause headache, anxiety, shortness of breath, and nosebleed.  How is this diagnosed?  To check if you have hypertension, your health care provider will measure your blood pressure while you are seated, with your arm held at the level of your heart. It should be measured at least twice using the same arm. Certain conditions can cause a difference in blood pressure between your right and left arms. A blood pressure reading that is higher than normal on one occasion does  not mean that you need treatment. If it is not clear whether you have high blood pressure, you may be asked to return on a different day to have your blood pressure checked again. Or, you may be asked to monitor your blood pressure at home for 1 or more weeks.  How is this treated?  Treating high blood pressure includes making lifestyle changes and possibly taking medicine. Living a healthy lifestyle can help lower high blood pressure. You may need to change some of your habits.  Lifestyle changes may include:  · Following the DASH diet. This diet is high in fruits, vegetables, and whole grains. It is low in salt, red meat, and added sugars.  · Keep your sodium intake below 2,300 mg per day.  · Getting at least 30-45 minutes of aerobic exercise at least 4 times per week.  · Losing weight if necessary.  · Not smoking.  · Limiting alcoholic beverages.  · Learning ways to reduce stress.  Your health care provider may prescribe medicine if lifestyle changes are not enough to get your blood pressure under control, and if one of the following is true:  · You are 18-59 years of age and your systolic blood pressure is above 140.  · You are 60 years of age or older, and your systolic blood pressure is above 150.  · Your diastolic blood pressure is above 90.  · You have diabetes, and your systolic blood pressure is over 140 or your diastolic blood pressure is over 90.  · You have kidney disease and your blood pressure is above 140/90.  · You have heart disease and your blood pressure is above 140/90.  Your personal target blood pressure may vary depending on your medical conditions, your age, and other factors.  Follow these instructions at home:  · Have your blood pressure rechecked as directed by your health care provider.  · Take medicines only as directed by your health care provider. Follow the directions carefully. Blood pressure medicines must be taken as prescribed. The medicine does not work as well when you skip  doses. Skipping doses also puts you at risk for problems.  · Do not smoke.  · Monitor your blood pressure at home as directed by your health care provider.  Contact a health care provider if:  · You think you are having a reaction to medicines taken.  · You have recurrent headaches or feel dizzy.  · You have swelling in your ankles.  · You have trouble with your vision.  Get help right away if:  · You develop a severe headache or confusion.  · You have unusual weakness, numbness, or feel faint.  · You have severe chest or abdominal pain.  · You vomit repeatedly.  · You have trouble breathing.  This information is not intended to replace advice given to you by your health care provider. Make sure you discuss any questions you have with your health care provider.  Document Released: 12/18/2006 Document Revised: 05/25/2017 Document Reviewed: 10/10/2014  [employer] Interactive Patient Education © 2017 [employer]

## 2019-05-17 ENCOUNTER — OFFICE VISIT (OUTPATIENT)
Dept: PULMONOLOGY | Facility: HOSPICE | Age: 44
End: 2019-05-17
Payer: COMMERCIAL

## 2019-05-17 VITALS
RESPIRATION RATE: 16 BRPM | OXYGEN SATURATION: 99 % | DIASTOLIC BLOOD PRESSURE: 66 MMHG | SYSTOLIC BLOOD PRESSURE: 122 MMHG | HEIGHT: 59 IN | BODY MASS INDEX: 27.42 KG/M2 | WEIGHT: 136 LBS | HEART RATE: 71 BPM

## 2019-05-17 DIAGNOSIS — R05.9 COUGH: ICD-10-CM

## 2019-05-17 DIAGNOSIS — J98.4 RESTRICTIVE LUNG DISEASE: ICD-10-CM

## 2019-05-17 DIAGNOSIS — R21 RASH: ICD-10-CM

## 2019-05-17 PROCEDURE — 99213 OFFICE O/P EST LOW 20 MIN: CPT | Performed by: PHYSICIAN ASSISTANT

## 2019-05-17 RX ORDER — BUDESONIDE AND FORMOTEROL FUMARATE DIHYDRATE 160; 4.5 UG/1; UG/1
2 AEROSOL RESPIRATORY (INHALATION) 2 TIMES DAILY
Qty: 1 INHALER | Refills: 6 | Status: SHIPPED | OUTPATIENT
Start: 2019-05-17

## 2019-05-17 ASSESSMENT — ENCOUNTER SYMPTOMS
WHEEZING: 1
HEARTBURN: 0
SORE THROAT: 0
PALPITATIONS: 0
HEMOPTYSIS: 1
COUGH: 1
WEIGHT LOSS: 0
NAUSEA: 0
SHORTNESS OF BREATH: 1
DIAPHORESIS: 0
CHILLS: 0
FEVER: 0
TINGLING: 1

## 2019-05-17 NOTE — PROGRESS NOTES
CC: Much improved cough    HPI:  Ameena Hines is a 44 y.o. year old female here today for follow-up on restrictive lung disease.  Last seen in clinic 4/5/2019.  Patient is a never smoker.    Patient is accompanied by her .  There is some language barrier present however  declined at this time.  She reports feeling much better overall.  Patient and  state they are both sleeping better now due to markedly decreased cough.  She has been using a mask for cooking.  She has stopped using over-the-counter cough medicine and is only using benzonatate if she gets coughing and cannot settle back down.  We reviewed Symbicort as a maintenance medication and albuterol (Ventolin) as a rescue, only as needed medication.  Both patient and  state understanding.    Reviewed in clinic vitals including blood pressure of 122/66, heart rate of 70, O2 sat of 99% and BMI of 27.47 kg/m².  Her hand rash is now clear.  She does report some increase in cough if she leaves the window open at night.  Discussed pollen counts and discouraged leaving window in bedroom open at night.    Reviewed most recent imaging including echo obtained 3/27/2019 with normal left ventricular chamber size, LVEF estimated at 65%, mild tricuspid regurg, right heart pressures normal.  Estimated RVSP of 20 mmHg.  Ascending aorta diameter is 3.2 cm. Chest x-ray obtained 3/7/2019 demonstrated no active cardiopulmonary abnormalities.      Review of Systems   Constitutional: Positive for malaise/fatigue. Negative for chills, diaphoresis, fever and weight loss.        Occasional increased fatigue   HENT: Negative for congestion, ear discharge, ear pain, hearing loss, sore throat and tinnitus.         Reports nasal passages dry, dry throat greater on the right   Eyes:        Does not wear glasses, no new or sudden vision changes   Respiratory: Positive for cough, hemoptysis, shortness of breath and wheezing.         Markedly improved cough,  "when she does cough its more at night especially if window was left open, white production   Cardiovascular: Negative for chest pain, palpitations and leg swelling.   Gastrointestinal: Negative for heartburn and nausea.   Skin:        Eczematous rash on hands is now gone   Neurological: Positive for tingling.        Thinks reports mild tingling fourth and fifth toe of left foot, intermittent discomfort when she touches left cheek       Past Medical History:   Diagnosis Date   • Chickenpox    • Hyperlipidemia    • Hypertension    • Thyroid disease        Past Surgical History:   Procedure Laterality Date   • LAPAROSCOPY         Family History   Problem Relation Age of Onset   • Hypertension Mother    • Hyperlipidemia Mother        Social History     Social History   • Marital status:      Spouse name: N/A   • Number of children: N/A   • Years of education: N/A     Occupational History   • Not on file.     Social History Main Topics   • Smoking status: Never Smoker   • Smokeless tobacco: Never Used   • Alcohol use No   • Drug use: No   • Sexual activity: Yes     Partners: Male     Other Topics Concern   • Not on file     Social History Narrative   • No narrative on file       Allergies as of 05/17/2019 - Reviewed 05/17/2019   Allergen Reaction Noted   • Eggplant Swelling 01/04/2019        @Vital signs for this encounter:  Vitals:    05/17/19 1106   Height: 1.499 m (4' 11\")   Weight: 61.7 kg (136 lb)   Weight % change since last entry.: 0 %   BP: 122/66   Pulse: 71   BMI (Calculated): 27.47   Resp: 16       Current medications as of today   Current Outpatient Prescriptions   Medication Sig Dispense Refill   • budesonide-formoterol (SYMBICORT) 160-4.5 MCG/ACT Aerosol Inhale 2 Puffs by mouth 2 Times a Day. Use spacer. Rinse mouth after each use. 1 Inhaler 6   • levothyroxine (SYNTHROID) 50 MCG Tab TAKE ONE TABLET BY MOUTH IN THE MORNING ON AN EMPTY STOMACH 90 Tab 0   • chlorthalidone (HYGROTON) 25 MG Tab TAKE ONE " TABLET BY MOUTH ONE TIME DAILY  90 Tab 0   • predniSONE (DELTASONE) 10 MG Tab Take 30mg x 3 days, then take 20mg x 3 days, then take 10mg x 3 days, with food, then discontinue. (Patient not taking: Reported on 5/17/2019) 18 Tab 0   • benzonatate (TESSALON) 100 MG Cap Take 1 Cap by mouth 3 times a day as needed for Cough. Do not chew.  Swallow whole. 60 Cap 1   • hydrocortisone 2.5 % Cream topical cream Apply 1 Application to affected area(s) 2 times a day. 1 Tube 1   • albuterol (VENTOLIN HFA) 108 (90 Base) MCG/ACT Aero Soln inhalation aerosol Inhale 2 Puffs by mouth every four hours as needed for Shortness of Breath. 1 Inhaler 4     No current facility-administered medications for this visit.          Physical Exam:   Gen:           Alert and oriented, No apparent distress. Mood and affect     appropriate, normal interaction with provider.  Eyes:          sclere white, conjunctive moist.  Hearing:     Grossly intact.  Dentition:    Fair dentition.  Oropharynx:   Tongue normal, posterior pharynx without erythema or exudate.  Neck:        Supple, trachea midline, no masses.  Respiratory Effort: No intercostal retractions or use of accessory muscles.   Lung Auscultation:      Decreased slightly bases otherwise clear to auscultation bilaterally  CV:            Regular rate and rhythm. No edema. No murmurs, rubs or gallops.  Digits, Nails, Ext: No clubbing, cyanosis, petechiae, or nodes.   Skin:        No rashes, lesions or ulcers noted on back or exposed skin    surfaces.                     Assessment:  1. Restrictive lung disease   continue Symbicort   2. Cough   markedly improved, albuterol only if needed   3. Rash   eczema-like rash now clear       Immunizations:    Flu: 10/15/2018  Pneumovax 23: Deferred  Prevnar 13: deferred    Plan:  1-has one refill on hand cream  2-refilled Symbicort for every day use  3- Ventolin (Albuterol) only if needed, not more often than 4 hours  4-follow up in 3 months sooner if needed      This dictation was created using voice recognition software. The accuracy of the dictation is limited to the abilities of the software. I expect there may be some errors of grammar and possibly content.

## 2019-05-17 NOTE — PATIENT INSTRUCTIONS
1-has one refill on hand cream  2-refilled Symbicort for every day use  3- Ventolin (Albuterol) only if needed, not more often than 4 hours  4-follow up in 3 months sooner if needed

## 2019-09-13 ENCOUNTER — OFFICE VISIT (OUTPATIENT)
Dept: PULMONOLOGY | Facility: HOSPICE | Age: 44
End: 2019-09-13
Payer: COMMERCIAL

## 2019-09-13 VITALS
RESPIRATION RATE: 16 BRPM | BODY MASS INDEX: 27.42 KG/M2 | OXYGEN SATURATION: 99 % | WEIGHT: 136 LBS | SYSTOLIC BLOOD PRESSURE: 116 MMHG | HEART RATE: 71 BPM | HEIGHT: 59 IN | DIASTOLIC BLOOD PRESSURE: 80 MMHG

## 2019-09-13 DIAGNOSIS — R05.9 COUGH: ICD-10-CM

## 2019-09-13 DIAGNOSIS — J45.20 MILD INTERMITTENT ASTHMA WITHOUT COMPLICATION: ICD-10-CM

## 2019-09-13 DIAGNOSIS — J30.2 SEASONAL ALLERGIES: ICD-10-CM

## 2019-09-13 PROCEDURE — 99214 OFFICE O/P EST MOD 30 MIN: CPT | Performed by: PHYSICIAN ASSISTANT

## 2019-09-13 ASSESSMENT — ENCOUNTER SYMPTOMS
WEIGHT LOSS: 0
FEVER: 0
INSOMNIA: 0
TREMORS: 0
CLAUDICATION: 0
CHILLS: 0
SORE THROAT: 0
HEADACHES: 0
PALPITATIONS: 0
DIZZINESS: 0
EYES NEGATIVE: 1
WHEEZING: 0
HEARTBURN: 1
SHORTNESS OF BREATH: 1
DIAPHORESIS: 0
ORTHOPNEA: 0
SINUS PAIN: 0
COUGH: 1
SPUTUM PRODUCTION: 1

## 2019-09-13 NOTE — PROGRESS NOTES
CC: throat tightness/itching    HPI:  Ameena Hines is a 44 y.o. year old female here today for follow-up on mild intermittent asthma.  Last seen in clinic 5/17/2019.  Patient is a never smoker.      She does cook over large cooking utensil using oil.  Patient is complaining of increased symptoms with seasonal change.  She does report symptom clearance approximately 3 months ago up to 15 days ago. Reports intermittent swelling in her face, uneasiness chest, itching both sides of throat and intermittent shortness of breath.  Reports feeling better when she takes deep breaths.  Ventolin was helpful but she reports now it is not, no relief after 5 minutes.  Has used Claritin with intermittent relief.  Mucinex has not relieved sensation of mucus stuck in throat or stiffness in throat or chest.    Appointments have been tried with and without language tablet but language tablet is requested today.  Even with , difficult to ascertain exact nature of complaints.  Patient does report symptoms worse when lying down.  Reports some shortness of breath with walking or sweating, reported reports voice goes down with talking but this was not noted during visit.  Reports feeling like this might be allergies or something in her throat rather than breathing.    Reviewed in clinic vitals including blood pressure 116/80, heart rate of 70, O2 sat of 99% on room air and BMI of 27.47 kg/m².    Reviewed home medication regimen which includes Symbicort on a daily basis, benzonatate as needed limited benefit, Ventolin also limited to benefit.    Reviewed most recent imaging including echocardiogram obtained 3/27/2019 demonstrating normal left ventricular chamber size, LVEF estimated 65%, mild tricuspid regurg, normal right heart pressures.    Chest x-ray obtained 3/6/2019 demonstrated no active cardiopulmonary process, normal cardiopericardial silhouette.    Last pulmonary function test obtained 10/15/2018 demonstrated FEV1 of  1.57 L or 66% predicted, FVC of 1.84 L or 62% predicted, FEV1/FVC ratio of 85, no significant postbronchodilator response.  Residual volume of 145% predicted, total lung capacity of 95% and DLCO 129% predicted.  Per pulmonologist interpretation pseudo-restriction secondary to BMI of 28.  Normal total lung capacity and diffusion capacity.      Review of Systems   Constitutional: Positive for malaise/fatigue. Negative for chills, diaphoresis, fever and weight loss.   HENT: Negative for congestion, hearing loss, nosebleeds, sinus pain, sore throat (dry, itching ) and tinnitus.    Eyes: Negative.    Respiratory: Positive for cough, sputum production (thick sticky white) and shortness of breath. Negative for wheezing.         Chest pressure with shortness of breath    Cardiovascular: Negative for chest pain, palpitations, orthopnea, claudication and leg swelling.   Gastrointestinal: Positive for heartburn (occasional spicy, lemon or citrus fruit , takes Zantac).   Skin: Negative.    Neurological: Negative for dizziness, tremors and headaches.   Psychiatric/Behavioral: The patient does not have insomnia.        Past Medical History:   Diagnosis Date   • Chickenpox    • Hyperlipidemia    • Hypertension    • Thyroid disease        Past Surgical History:   Procedure Laterality Date   • LAPAROSCOPY         Family History   Problem Relation Age of Onset   • Hypertension Mother    • Hyperlipidemia Mother        Social History     Socioeconomic History   • Marital status:      Spouse name: Not on file   • Number of children: Not on file   • Years of education: Not on file   • Highest education level: Not on file   Occupational History   • Not on file   Social Needs   • Financial resource strain: Not on file   • Food insecurity:     Worry: Not on file     Inability: Not on file   • Transportation needs:     Medical: Not on file     Non-medical: Not on file   Tobacco Use   • Smoking status: Never Smoker   • Smokeless  "tobacco: Never Used   Substance and Sexual Activity   • Alcohol use: No   • Drug use: No   • Sexual activity: Yes     Partners: Male   Lifestyle   • Physical activity:     Days per week: Not on file     Minutes per session: Not on file   • Stress: Not on file   Relationships   • Social connections:     Talks on phone: Not on file     Gets together: Not on file     Attends Holiness service: Not on file     Active member of club or organization: Not on file     Attends meetings of clubs or organizations: Not on file     Relationship status: Not on file   • Intimate partner violence:     Fear of current or ex partner: Not on file     Emotionally abused: Not on file     Physically abused: Not on file     Forced sexual activity: Not on file   Other Topics Concern   • Not on file   Social History Narrative   • Not on file       Allergies as of 09/13/2019 - Reviewed 09/13/2019   Allergen Reaction Noted   • Eggplant Swelling 01/04/2019        @Vital signs for this encounter:  Vitals:    09/13/19 0859   Height: 1.499 m (4' 11\")   Weight: 61.7 kg (136 lb)   Weight % change since last entry.: 0 %   BP: 116/80   Pulse: 71   BMI (Calculated): 27.47   Resp: 16       Current medications as of today   Current Outpatient Medications   Medication Sig Dispense Refill   • budesonide-formoterol (SYMBICORT) 160-4.5 MCG/ACT Aerosol Inhale 2 Puffs by mouth 2 Times a Day. Use spacer. Rinse mouth after each use. 1 Inhaler 6   • levothyroxine (SYNTHROID) 50 MCG Tab TAKE ONE TABLET BY MOUTH IN THE MORNING ON AN EMPTY STOMACH 90 Tab 0   • chlorthalidone (HYGROTON) 25 MG Tab TAKE ONE TABLET BY MOUTH ONE TIME DAILY  90 Tab 0   • benzonatate (TESSALON) 100 MG Cap Take 1 Cap by mouth 3 times a day as needed for Cough. Do not chew.  Swallow whole. 60 Cap 1   • hydrocortisone 2.5 % Cream topical cream Apply 1 Application to affected area(s) 2 times a day. 1 Tube 1   • albuterol (VENTOLIN HFA) 108 (90 Base) MCG/ACT Aero Soln inhalation aerosol Inhale " 2 Puffs by mouth every four hours as needed for Shortness of Breath. 1 Inhaler 4   • predniSONE (DELTASONE) 10 MG Tab Take 30mg x 3 days, then take 20mg x 3 days, then take 10mg x 3 days, with food, then discontinue. (Patient not taking: Reported on 5/17/2019) 18 Tab 0     No current facility-administered medications for this visit.          Physical Exam:   Gen:           Alert and oriented, No apparent distress. Mood and affect     appropriate, normal interaction with provider.  Eyes:          sclere white, conjunctive moist.  Hearing:     Grossly intact.  Dentition:    Good dentition.  Oropharynx:   Tongue normal, posterior pharynx without erythema or exudate.  Neck:        Supple, trachea midline, no masses, no lymphadenopathy noted.  Respiratory Effort: No intercostal retractions or use of accessory muscles.   Lung Auscultation:      Few crackles bases otherwise clear to auscultation bilaterally; no rales, rhonchi or wheezing.  CV:            Regular rate and rhythm. No edema. No murmurs, rubs or gallops.  Digits, Nails, Ext: No clubbing, cyanosis, petechiae, or nodes.   Skin:        No rashes, lesions or ulcers noted on back or exposed skin surfaces.                     Assessment:  1. Mild intermittent asthma without complication   no change in current medications   2. Seasonal allergies  REFERRAL TO ALLERGY   3. Cough  REFERRAL TO ENT       Immunizations:    Flu: 10/15/2018, recommend update September October 2019  Pneumovax 23: Deferred  Prevnar 13: Deferred    Plan:    1-some increase cough with secretions  2-did testing in fall including Quanteferin gold which was negative  3-did get relief with steroid course in spring  4-hold on this for now, pending allergy specialist follow up  5-consider Kenalog  6-referral to ENT as well, due to patient complaints of throat issues  7-consider acapella device  8-consider evaluation for vocal cord dysfunction/cough variant  9-follow up in 3 months     This dictation  was created using voice recognition software. The accuracy of the dictation is limited to the abilities of the software. I expect there may be some errors of grammar and possibly content.

## 2019-09-13 NOTE — PATIENT INSTRUCTIONS
1-some increase cough with secretions  2-did testing in fall including Quanteferin gold which was negative  3-did get relief with steroid course in spring  4-hold on this for now, pending allergy specialist follow up  5-consider Kenalog  6-referral to ENT as well, due to patient complaints of throat  7-consider acapella device  8-consider evaluation for vocal cord dysfunction/cough variant  9-follow up in 3 months

## 2019-11-09 ENCOUNTER — HOSPITAL ENCOUNTER (OUTPATIENT)
Dept: LAB | Facility: MEDICAL CENTER | Age: 44
End: 2019-11-09
Attending: STUDENT IN AN ORGANIZED HEALTH CARE EDUCATION/TRAINING PROGRAM
Payer: COMMERCIAL

## 2019-11-09 LAB
ANION GAP SERPL CALC-SCNC: 10 MMOL/L (ref 0–11.9)
BUN SERPL-MCNC: 14 MG/DL (ref 8–22)
CALCIUM SERPL-MCNC: 9.9 MG/DL (ref 8.5–10.5)
CHLORIDE SERPL-SCNC: 97 MMOL/L (ref 96–112)
CO2 SERPL-SCNC: 30 MMOL/L (ref 20–33)
CREAT SERPL-MCNC: 0.64 MG/DL (ref 0.5–1.4)
GLUCOSE SERPL-MCNC: 67 MG/DL (ref 65–99)
POTASSIUM SERPL-SCNC: 3.3 MMOL/L (ref 3.6–5.5)
SODIUM SERPL-SCNC: 137 MMOL/L (ref 135–145)

## 2019-11-09 PROCEDURE — 36415 COLL VENOUS BLD VENIPUNCTURE: CPT

## 2019-11-09 PROCEDURE — 80048 BASIC METABOLIC PNL TOTAL CA: CPT

## 2019-12-06 ENCOUNTER — OFFICE VISIT (OUTPATIENT)
Dept: PULMONOLOGY | Facility: HOSPICE | Age: 44
End: 2019-12-06
Payer: COMMERCIAL

## 2019-12-06 VITALS
RESPIRATION RATE: 16 BRPM | HEART RATE: 68 BPM | DIASTOLIC BLOOD PRESSURE: 64 MMHG | BODY MASS INDEX: 27.82 KG/M2 | SYSTOLIC BLOOD PRESSURE: 132 MMHG | HEIGHT: 59 IN | OXYGEN SATURATION: 96 % | WEIGHT: 138 LBS

## 2019-12-06 DIAGNOSIS — J45.30 MILD PERSISTENT ASTHMA WITHOUT COMPLICATION: ICD-10-CM

## 2019-12-06 DIAGNOSIS — R05.9 COUGH: ICD-10-CM

## 2019-12-06 DIAGNOSIS — R91.1 LUNG NODULE: ICD-10-CM

## 2019-12-06 DIAGNOSIS — J30.2 SEASONAL ALLERGIC RHINITIS, UNSPECIFIED TRIGGER: ICD-10-CM

## 2019-12-06 PROCEDURE — 90686 IIV4 VACC NO PRSV 0.5 ML IM: CPT | Performed by: INTERNAL MEDICINE

## 2019-12-06 PROCEDURE — 90471 IMMUNIZATION ADMIN: CPT | Performed by: INTERNAL MEDICINE

## 2019-12-06 PROCEDURE — 99214 OFFICE O/P EST MOD 30 MIN: CPT | Mod: 25 | Performed by: INTERNAL MEDICINE

## 2019-12-06 NOTE — PROGRESS NOTES
Ameena Hines is a 44 y.o. female here for asthma and cough with predominant vocal cord symptoms. Patient was referred by primary care.    History of Present Illness:      This lady comes in today with her , she understands well and he interprets well, good communication and they indicate that she has continued cough, I strongly suspect this is upper, as her lungs are entirely clear.  She does use the inhalers, with some benefit, but I think her predominant symptoms are vocal cord postnasal drip and she has seen allergy, her  indicates they did not find anything specific but more importantly she has been referred to ear nose and throat.  She sees them on Monday.    Her lungs are clear, the inhalers are good, flu vaccine administered today, and we will await the ENT eval.  Recheck here in 6 months.    Constitutional ROS: No unexpected change in weight, No unexplained fevers  Eyes: No change in vision or blurring or double vision  Mouth/Throat ROS: No sore throat, No recent change in voice or hoarseness  Pulmonary ROS: See present history for pertinent positives  Cardiovascular ROS: No chest pain to suggest acute coronary syndrome  Gastrointestinal ROS: No abdominal pain to suggest peptic disease  Musculoskeletal/Extremities ROS: no acute artritis or unusual swelling  Hematologic/Lymphatic ROS: No easy bleeding or unusual lymph node swelling  Neurologic ROS: No new or unusual weakness  Psychiatric ROS: No hallucinations  Allergic/Immunologic: No  urticaria or allergic rash      Current Outpatient Medications   Medication Sig Dispense Refill   • Tiotropium Bromide Monohydrate (SPIRIVA RESPIMAT) 1.25 MCG/ACT Aero Soln Inhale  by mouth.     • budesonide-formoterol (SYMBICORT) 160-4.5 MCG/ACT Aerosol Inhale 2 Puffs by mouth 2 Times a Day. Use spacer. Rinse mouth after each use. 1 Inhaler 6   • levothyroxine (SYNTHROID) 50 MCG Tab TAKE ONE TABLET BY MOUTH IN THE MORNING ON AN EMPTY STOMACH 90 Tab 0   •  "chlorthalidone (HYGROTON) 25 MG Tab TAKE ONE TABLET BY MOUTH ONE TIME DAILY  90 Tab 0   • hydrocortisone 2.5 % Cream topical cream Apply 1 Application to affected area(s) 2 times a day. 1 Tube 1   • albuterol (VENTOLIN HFA) 108 (90 Base) MCG/ACT Aero Soln inhalation aerosol Inhale 2 Puffs by mouth every four hours as needed for Shortness of Breath. 1 Inhaler 4   • predniSONE (DELTASONE) 10 MG Tab Take 30mg x 3 days, then take 20mg x 3 days, then take 10mg x 3 days, with food, then discontinue. (Patient not taking: Reported on 12/6/2019) 18 Tab 0   • benzonatate (TESSALON) 100 MG Cap Take 1 Cap by mouth 3 times a day as needed for Cough. Do not chew.  Swallow whole. (Patient not taking: Reported on 12/6/2019) 60 Cap 1     No current facility-administered medications for this visit.        Social History     Tobacco Use   • Smoking status: Never Smoker   • Smokeless tobacco: Never Used   Substance Use Topics   • Alcohol use: No   • Drug use: No        Past Medical History:   Diagnosis Date   • Chickenpox    • Hyperlipidemia    • Hypertension    • Thyroid disease        Past Surgical History:   Procedure Laterality Date   • LAPAROSCOPY         Allergies: Eggplant    Family History   Problem Relation Age of Onset   • Hypertension Mother    • Hyperlipidemia Mother        Physical Examination    Vitals:    12/06/19 0922   Height: 1.499 m (4' 11\")   Weight: 62.6 kg (138 lb)   Weight % change since last entry.: 0 %   BP: 132/64   Pulse: 68   BMI (Calculated): 27.87   Resp: 16       General Appearance: alert, no distress  Skin: Skin color, texture, turgor normal. No rashes or lesions.  Eyes: negative  Oropharynx: Lips, mucosa, and tongue normal. Teeth and gums normal. Oropharynx moist and without lesion  Lungs: positive findings: Remarkably quiet and clear  Heart: negative. RRR without murmur, gallop, or rubs.  No ectopy.  Abdomen: Abdomen soft, non-tender. . No masses,  No organomegaly  Extremities:  No deformities, edema, " or skin discoloration  Joints: No acute arthritis  Peripheral Pulses:perfused  Neurologic: intact grossly  No club    I (soft palate, uvula, fauces, tonsillar pillars visible)    Imaging: None today    PFTS: None today      Assessment and Plan  1. Mild persistent asthma without complication  Well-controlled, not using Symbicort, rescue inhaler inhaler very rarely  - Influenza Vaccine Quad Injection (PF)    2. Seasonal allergic rhinitis, unspecified trigger  Predominant symptoms are vocal cord and upper airway postnasal drip stimulated    3. Cough  ENT evaluation pending    4. Lung nodule  Follow-up in 6 months      Followup Return in about 6 months (around 6/6/2020) for follow up visit with APRN.

## 2019-12-06 NOTE — PATIENT INSTRUCTIONS
This lady comes in today with her , she understands well and he interprets well, good communication and they indicate that she has continued cough, I strongly suspect this is upper, as her lungs are entirely clear.  She does use the inhalers, with some benefit, but I think her predominant symptoms are vocal cord postnasal drip and she has seen allergy, her  indicates they did not find anything specific but more importantly she has been referred to ear nose and throat.  She sees them on Monday.    Her lungs are clear, the inhalers are good, flu vaccine administered today, and we will await the ENT eval.  Recheck here in 6 months.

## 2020-08-10 ENCOUNTER — APPOINTMENT (OUTPATIENT)
Dept: PULMONOLOGY | Facility: HOSPICE | Age: 45
End: 2020-08-10

## 2020-12-11 ENCOUNTER — HOSPITAL ENCOUNTER (EMERGENCY)
Dept: HOSPITAL 8 - ED | Age: 45
LOS: 1 days | Discharge: HOME | End: 2020-12-12
Payer: COMMERCIAL

## 2020-12-11 VITALS — HEIGHT: 59 IN | BODY MASS INDEX: 27.42 KG/M2 | WEIGHT: 136.03 LBS

## 2020-12-11 VITALS — DIASTOLIC BLOOD PRESSURE: 69 MMHG | SYSTOLIC BLOOD PRESSURE: 117 MMHG

## 2020-12-11 DIAGNOSIS — R94.31: ICD-10-CM

## 2020-12-11 DIAGNOSIS — R10.13: Primary | ICD-10-CM

## 2020-12-11 DIAGNOSIS — Z20.828: ICD-10-CM

## 2020-12-11 DIAGNOSIS — R10.84: ICD-10-CM

## 2020-12-11 DIAGNOSIS — R07.89: ICD-10-CM

## 2020-12-11 DIAGNOSIS — M79.10: ICD-10-CM

## 2020-12-11 DIAGNOSIS — R50.9: ICD-10-CM

## 2020-12-11 DIAGNOSIS — R05: ICD-10-CM

## 2020-12-11 LAB
ALBUMIN SERPL-MCNC: 4 G/DL (ref 3.4–5)
ALP SERPL-CCNC: 107 U/L (ref 45–117)
ALT SERPL-CCNC: 32 U/L (ref 12–78)
ANION GAP SERPL CALC-SCNC: 6 MMOL/L (ref 5–15)
BASOPHILS # BLD AUTO: 0 X10^3/UL (ref 0–0.1)
BASOPHILS NFR BLD AUTO: 0 % (ref 0–1)
BILIRUB SERPL-MCNC: 1.7 MG/DL (ref 0.2–1)
CALCIUM SERPL-MCNC: 9.6 MG/DL (ref 8.5–10.1)
CHLORIDE SERPL-SCNC: 92 MMOL/L (ref 98–107)
CREAT SERPL-MCNC: 0.66 MG/DL (ref 0.55–1.02)
EOSINOPHIL # BLD AUTO: 0.2 X10^3/UL (ref 0–0.4)
EOSINOPHIL NFR BLD AUTO: 1 % (ref 1–7)
ERYTHROCYTE [DISTWIDTH] IN BLOOD BY AUTOMATED COUNT: 13.9 % (ref 9.6–15.2)
LYMPHOCYTES # BLD AUTO: 1.5 X10^3/UL (ref 1–3.4)
LYMPHOCYTES NFR BLD AUTO: 7 % (ref 22–44)
MCH RBC QN AUTO: 24.5 PG (ref 27–34.8)
MCHC RBC AUTO-ENTMCNC: 32.4 G/DL (ref 32.4–35.8)
MD: NO
MICROSCOPIC: (no result)
MONOCYTES # BLD AUTO: 1.4 X10^3/UL (ref 0.2–0.8)
MONOCYTES NFR BLD AUTO: 7 % (ref 2–9)
NEUTROPHILS # BLD AUTO: 17.8 X10^3/UL (ref 1.8–6.8)
NEUTROPHILS NFR BLD AUTO: 85 % (ref 42–75)
PLATELET # BLD AUTO: 260 X10^3/UL (ref 130–400)
PMV BLD AUTO: 8.9 FL (ref 7.4–10.4)
PROT SERPL-MCNC: 8.1 G/DL (ref 6.4–8.2)
RBC # BLD AUTO: 5.18 X10^6/UL (ref 3.82–5.3)
TROPONIN I SERPL-MCNC: < 0.015 NG/ML (ref 0–0.04)

## 2020-12-11 PROCEDURE — 83690 ASSAY OF LIPASE: CPT

## 2020-12-11 PROCEDURE — 85025 COMPLETE CBC W/AUTO DIFF WBC: CPT

## 2020-12-11 PROCEDURE — 80053 COMPREHEN METABOLIC PANEL: CPT

## 2020-12-11 PROCEDURE — 84484 ASSAY OF TROPONIN QUANT: CPT

## 2020-12-11 PROCEDURE — 74177 CT ABD & PELVIS W/CONTRAST: CPT

## 2020-12-11 PROCEDURE — 99285 EMERGENCY DEPT VISIT HI MDM: CPT

## 2020-12-11 PROCEDURE — 81003 URINALYSIS AUTO W/O SCOPE: CPT

## 2020-12-11 PROCEDURE — 87635 SARS-COV-2 COVID-19 AMP PRB: CPT

## 2020-12-11 PROCEDURE — 71045 X-RAY EXAM CHEST 1 VIEW: CPT

## 2020-12-11 PROCEDURE — 36415 COLL VENOUS BLD VENIPUNCTURE: CPT

## 2020-12-11 PROCEDURE — 93005 ELECTROCARDIOGRAM TRACING: CPT
